# Patient Record
Sex: FEMALE | Race: BLACK OR AFRICAN AMERICAN | NOT HISPANIC OR LATINO | Employment: UNEMPLOYED | URBAN - METROPOLITAN AREA
[De-identification: names, ages, dates, MRNs, and addresses within clinical notes are randomized per-mention and may not be internally consistent; named-entity substitution may affect disease eponyms.]

---

## 2020-05-11 ENCOUNTER — HOSPITAL ENCOUNTER (EMERGENCY)
Facility: HOSPITAL | Age: 60
Discharge: HOME | End: 2020-05-11
Attending: EMERGENCY MEDICINE
Payer: MEDICAID

## 2020-05-11 VITALS
BODY MASS INDEX: 35 KG/M2 | OXYGEN SATURATION: 98 % | RESPIRATION RATE: 18 BRPM | DIASTOLIC BLOOD PRESSURE: 85 MMHG | TEMPERATURE: 98 F | HEART RATE: 79 BPM | HEIGHT: 71 IN | WEIGHT: 250 LBS | SYSTOLIC BLOOD PRESSURE: 138 MMHG

## 2020-05-11 DIAGNOSIS — L30.9 DERMATITIS: Primary | ICD-10-CM

## 2020-05-11 PROCEDURE — 99283 EMERGENCY DEPT VISIT LOW MDM: CPT

## 2020-05-11 RX ORDER — PREDNISONE 20 MG/1
40 TABLET ORAL DAILY
Qty: 10 TABLET | Refills: 0 | Status: SHIPPED | OUTPATIENT
Start: 2020-05-11 | End: 2020-05-14

## 2020-05-11 NOTE — ED PROVIDER NOTES
Encounter Date: 5/11/2020    SCRIBE #1 NOTE: I, Syl Phoenix, am scribing for, and in the presence of,  Dr. Vargas. I have scribed the entire note.       History     Chief Complaint   Patient presents with    Rash     Pt has rash to arms and face x 2 weeks.      Time seen by provider: 7:41 AM on 05/11/2020    The patient is a 59 y.o female who presents to the ED due to a rash that started about 2 weeks ago. The patient states that it started on her left arm and has now spread to her right arm, neck. She describes it as itchy with some swelling and redness. She denies any pain to the area, fever, or difficulty breathing. The patient notes that she usually gets a small rash on her arms every summer but her current symptoms are worse than normal. She used a topical cortisone cream that helped for a while. She denies new detergents or soaps, perfumes, etc, and states that she occasionally gardens and runs outside but says she does not recall any contact with poison ivy/oak.     The history is provided by the patient.     Review of patient's allergies indicates:  No Known Allergies  No past medical history on file.  No past surgical history on file.  No family history on file.  Social History     Tobacco Use    Smoking status: Not on file   Substance Use Topics    Alcohol use: Not on file    Drug use: Not on file     Review of Systems   General: No fever.  No chills.  Eyes: No visual changes.  Head: No headache.    Integument: Rash to bilateral arms, neck. No lesions.  Chest: No shortness of breath.  Cardiovascular: No chest pain.  Abdomen: No abdominal pain.  No nausea or vomiting.  Urinary: No abnormal urination.  Neurologic: No focal weakness.  No numbness.  Hematologic: No easy bruising.  Endocrine: No excessive thirst or urination.      Physical Exam     Initial Vitals [05/11/20 0728]   BP Pulse Resp Temp SpO2   138/85 79 18 97.5 °F (36.4 °C) 98 %      MAP       --         Physical Exam   Appearance: No acute  distress.   HEENT: Normocephalic. Atraumatic. No conjunctival injection. EOMI. PERRL.  No angioedema.  Neck: No stridor. No JVD. Neck supple.    Chest: Non-tender. No respiratory distress. No wheezing. Speaking in full sentences.  Cardiovascular: Regular rate and rhythm. +2 radial pulses bilaterally.  Abdomen: Not distended   Musculoskeletal: Good range of motion all joints. No deformities.    Neurologic: Alert and oriented x 3.  Equal strength in upper and lower extremities bilaterally. Normal sensation. No facial droop. Normal speech.    Psych:  Appropriate, conversant   Integumentary: Diffuse papular somewhat coalescing exanthem to bilateral upper extremities and bilateral neck. No tenderness to palpation or crepitus. No abnormalities of web spaces. Good turgor.  No abrasions.  No ecchymoses.      ED Course   Procedures  Labs Reviewed - No data to display       Imaging Results    None                                          Clinical Impression:     1. Dermatitis                ED Disposition Condition    Discharge Stable      60 yo female presents to ED for evaluation of rash x 2 weeks. Exam shows VSS, afeb. Diffuse papular somewhat coalescing exanthem to bilateral upper extremities and bilateral neck. No tenderness to palpation or crepitus. No abnormalities of web spaces. Discussed r/b/a topical steroids v oral; pt chose oral.    Discussed results, diagnosis, and treatment plan with pt; advised close follow-up with PCP. Reviewed strict return precautions. Pt confirms understanding and ability to comply.      I, Dr. Isaura Vargas, personally performed the services described in this documentation. All medical record entries made by the scribe were at my direction and in my presence.  I have reviewed the chart and agree that the record reflects my personal performance and is accurate and complete. Isaura Vargas MD.  10:21 AM 05/11/2020          ED Prescriptions     Medication Sig Dispense Start Date End Date  Auth. Provider    predniSONE (DELTASONE) 20 MG tablet Take 2 tablets (40 mg total) by mouth once daily. for 3 days 10 tablet 5/11/2020 5/14/2020 Isaura Vargas MD        Follow-up Information     Follow up With Specialties Details Why Contact Info    Jessenia Moffett MD Pediatrics Schedule an appointment as soon as possible for a visit   [address] [place] [address]                               Isaura Vargas MD  05/11/20 7498       Isaura Vargas MD  05/11/20 4771

## 2020-05-11 NOTE — ED NOTES
APPEARANCE: Alert, oriented and in no acute distress.  CARDIAC: Normal rate and rhythm, no murmur heard.   PERIPHERAL VASCULAR: peripheral pulses present. Normal cap refill. No edema. Warm to touch.    RESPIRATORY:Normal rate and effort, breath sounds clear bilaterally throughout chest. Respirations are equal and unlabored no obvious signs of distress.  GASTRO: soft, bowel sounds normal, no tenderness, no abdominal distention.  MUSC: Full ROM. No bony tenderness or soft tissue tenderness. No obvious deformity.  SKIN: Skin is warm and dry, normal skin turgor, mucous membranes moist. Rash noted to bilateral arms, neck and face.  NEURO: 5/5 strength major flexors/extensors bilaterally. Sensory intact to light touch bilaterally. Donald coma scale: eyes open spontaneously-4, oriented & converses-5, obeys commands-6. No neurological abnormalities.   MENTAL STATUS: awake, alert and aware of environment.  EYE: PERRL, both eyes: pupils brisk and reactive to light. Normal size.  ENT: EARS: no obvious drainage. NOSE: no active bleeding.   Pt has rash to bilateral arms, neck and face x 2 weeks.

## 2020-05-13 ENCOUNTER — HOSPITAL ENCOUNTER (EMERGENCY)
Facility: HOSPITAL | Age: 60
Discharge: HOME | End: 2020-05-13
Attending: EMERGENCY MEDICINE
Payer: MEDICAID

## 2020-05-13 VITALS
BODY MASS INDEX: 35 KG/M2 | HEART RATE: 82 BPM | HEIGHT: 71 IN | DIASTOLIC BLOOD PRESSURE: 94 MMHG | RESPIRATION RATE: 16 BRPM | OXYGEN SATURATION: 98 % | WEIGHT: 250 LBS | TEMPERATURE: 98 F | SYSTOLIC BLOOD PRESSURE: 154 MMHG

## 2020-05-13 DIAGNOSIS — R21 RASH AND NONSPECIFIC SKIN ERUPTION: Primary | ICD-10-CM

## 2020-05-13 PROCEDURE — 99284 EMERGENCY DEPT VISIT MOD MDM: CPT

## 2020-05-13 RX ORDER — METHYLPREDNISOLONE 4 MG/1
TABLET ORAL
Qty: 1 PACKAGE | Refills: 0 | Status: SHIPPED | OUTPATIENT
Start: 2020-05-13 | End: 2020-06-03

## 2020-05-13 RX ORDER — TRIAMCINOLONE ACETONIDE 1 MG/G
OINTMENT TOPICAL 2 TIMES DAILY
Qty: 1 TUBE | Refills: 1 | Status: SHIPPED | OUTPATIENT
Start: 2020-05-13

## 2020-05-14 NOTE — PROGRESS NOTES
Emergency Department TeleTRIAGE Encounter Note    CHIEF COMPLAINT    Chief Complaint   Patient presents with    Rash     59y F ambulatroy to ED with c/o red, raised, itching rash x2 weeks. pt was seen on 5/11/20 for same complaint       VITAL SIGNS:   Initial Vitals [05/13/20 1916]   BP Pulse Resp Temp SpO2   (!) 154/94 82 16 98.2 °F (36.8 °C) 98 %      MAP       --            ALLERGIES    Review of patient's allergies indicates:   Allergen Reactions    Shellfish containing products Swelling       PROVIDER TRIAGE NOTE  Patient with no significant past medical history presents to the ED for evaluation of rash.  Patient reports red, raised, itching rash past 2 weeks.  She was seen 2 nights ago for the same complaint.  At that time she was prescribed prednisone which she states she is taking without relief.  She is also taking Benadryl without relief of the itching.  She denies fever, new soaps, lotions, detergents, other skin care products.                Virtual Visit Note: The provider triage portion of this emergency department evaluation was performed via a HIPAA-compliant audiovisual telemedicine application, in concert with a tele-presenter in the room. A face to face patient evaluation with a provider will occur once the patient is placed in an emergency department room.    DISCLAIMER: This note was prepared with [employer] voice recognition transcription software. Garbled syntax, mangled pronouns, and other bizarre constructions may be attributed to that software system.

## 2020-05-14 NOTE — ED TRIAGE NOTES
Present to ED due to complaint of rash to body and itching. Report was seen in the ED Monday and was given Prednisone with marked improvement but when prescription was completed rash returned. Report only new food is hazelnut creamer. Rash noted to face and body.

## 2020-05-14 NOTE — ED NOTES
APPEARANCE: Alert, oriented and in no acute distress.  PERIPHERAL VASCULAR: peripheral pulses present. Normal cap refill. No edema. Warm to touch.    MUSC: Full ROM. No bony tenderness or soft tissue tenderness. No obvious deformity.  SKIN: Skin is warm and dry, normal skin turgor, mucous membranes moist. RASH NOTED TO FACE AND BODY.  NEURO: 5/5 strength major flexors/extensors bilaterally. Sensory intact to light touch bilaterally. Garrett coma scale: eyes open spontaneously-4, oriented & converses-5, obeys commands-6. No neurological abnormalities.   MENTAL STATUS: awake, alert and aware of environment.  ENT: EARS: no obvious drainage. NOSE: no active bleeding.

## 2020-05-14 NOTE — ED PROVIDER NOTES
"Encounter Date: 5/13/2020    SCRIBE #1 NOTE: I, Jensen Hall, am scribing for, and in the presence of, Teri Kearns MD.       History     Chief Complaint   Patient presents with    Rash     59y F ambulatroy to ED with c/o red, raised, itching rash x2 weeks. pt was seen on 5/11/20 for same complaint     Time seen by provider: 8:28 PM on 05/13/2020    The patient is a 59 y.o. female who presents to the ED for evaluation of generalized rash for the past 2 weeks. Patient reports red, itchy, raised rashes to her face, neck, and arms. Patient was seen 2 days ago for same and discharged on steroids without improvement. Patient reports she usually develops rashes in the summer but "not this bad." She denies using any new soaps/detergents or topical creams but does mention using a new hazelnut cream in her coffee which she has never used before until recently. She denies fever, chills, chest pain or SOB. She has not seen a Dermatologist for this.        The history is provided by the patient.     Review of patient's allergies indicates:   Allergen Reactions    Shellfish containing products Swelling     No past medical history on file.  No past surgical history on file.  History reviewed. No pertinent family history.  Social History     Tobacco Use    Smoking status: Not on file   Substance Use Topics    Alcohol use: Not on file    Drug use: Not on file     Review of Systems   Constitutional: Negative for chills and fever.   Respiratory: Negative for shortness of breath.    Cardiovascular: Negative for chest pain.   Skin: Positive for rash.   All other systems reviewed and are negative.      Physical Exam     Initial Vitals [05/13/20 1916]   BP Pulse Resp Temp SpO2   (!) 154/94 82 16 98.2 °F (36.8 °C) 98 %      MAP       --         Physical Exam    Nursing note and vitals reviewed.  Constitutional: She appears well-developed and well-nourished.   HENT:   Head: Normocephalic and atraumatic.   Eyes: EOM are normal. Pupils " are equal, round, and reactive to light.   Neck: Normal range of motion. Neck supple.   Musculoskeletal: Normal range of motion. She exhibits no edema.   Neurological: She is alert and oriented to person, place, and time. GCS score is 15. GCS eye subscore is 4. GCS verbal subscore is 5. GCS motor subscore is 6.   Skin: Skin is warm and dry. Rash noted.    macular papular blanching rash in the upper extremities, chest, neck and face.  There is no sloshing of skin,nikolsky is negative.  No significant warmth, no blistering, no cellulitic signs    Psychiatric: She has a normal mood and affect. Her behavior is normal. Thought content normal.         ED Course   Procedures  Labs Reviewed - No data to display       Imaging Results    None          Medical Decision Making:   Initial Assessment:   This is a 59-year-old female who reports a history of hypertension, hyperlipidemia, seizure disorder on Keppra who presents the ER for evaluation of rash.  Patient reported onset of rash was over 3-4 weeks ago, she tends to get this type of rash in the summer when it is hot, however this is the worst outbreak she has had.  She denies any fever, chills, chest pain, shortness of breath, rash in her mouth, or genitals.  She denies any new lotions, creams, conditioners, soaps or shampoos.  She was seen and evaluated in this ER recently, was given prednisone which she completed and reported improvement of her symptoms but now with worsen.  She came to the ER because of worsening of symptoms.  Patient has a macular papular blanching rash in the upper extremities, chest, neck and face.  There is no sloshing of this skin,nikolsky is negative.  Patient's vitals are stable at this time.  Differential includes nonspecific dermatitis, allergic reaction, dress syndrome, erythema multiform, Jose Francisco Sabino's, or toxic epidermal necrolysis or heat rash.  I discussed with patient at length, she reports she does get this same rash every year, but  it tends to be worse.  Discussed the patient at length, discussed plan to discharge home, will give Medrol Dosepak as well as topical steroids and will give referral for dermatologist.  If rashes worsened, patient will stop her Keppra in case this is early dress or Jose Francisco Sabino's.  Strict return precautions were discussed with patient understood agreed with.  Patient will be discharged.                Attending Attestation:           Physician Attestation for Scribe:  Physician Attestation Statement for Scribe #1: Teri ABBOTT MD, reviewed documentation, as scribed by Jensen aHll in my presence, and it is both accurate and complete.                               Clinical Impression:       ICD-10-CM ICD-9-CM   1. Rash and nonspecific skin eruption R21 782.1           Disposition:   Disposition: Discharged  Condition: Stable     ED Disposition Condition    Discharge Stable        ED Prescriptions     Medication Sig Dispense Start Date End Date Auth. Provider    triamcinolone acetonide 0.1% (KENALOG) 0.1 % ointment Apply topically 2 (two) times daily. 1 Tube 5/13/2020  Teri Kearns MD    methylPREDNISolone (MEDROL DOSEPACK) 4 mg tablet Day 1: 24 mg on day 1 administered as 8 mg (2 tablets) before breakfast, 4 mg (1 tablet) after lunch, 4 mg (1 tablet) after supper, and 8 mg (2 tablets) at bedtime or 24 mg (6 tablets)   Day 2: 20 mg on day 2 administered as 4 mg (1 tablet) before breakfast, 4 mg (1 tablet) after lunch, 4 mg (1 tablet) after supper, and 8 mg (2 tablets) at bedtime.  Day 3: 16 mg on day 3 administered as 4 mg (1 tablet) before breakfast, 4 mg (1 tablet) after lunch, 4 mg (1 tablet) after supper, and 4 mg (1 tablet) at bedtime.  Day 4: 12 mg on day 4 administered as 4 mg (1 tablet) before breakfast, 4 mg (1 tablet) after lunch, and 4 mg (1 tablet) at bedtime.  Day 5: 8 mg on day 5 administered as 4 mg (1 tablet) before breakfast and 4 mg (1 tablet) at bedtime.  Day 6: 4 mg on day 6  administered as 4 mg (1 tablet) before breakfast. 1 Package 5/13/2020 6/3/2020 Teri Kearns MD        Follow-up Information     Follow up With Specialties Details Why Contact Info Additional Information    Jessenia Moffett MD Pediatrics   [address]  [place] [address]       [facility] Dermatology Dermatology Call in 1 day  [address] [address] [phone] [facility] [place], [address]                                      Teri Kearns MD  05/13/20 2308

## 2020-06-02 ENCOUNTER — PATIENT OUTREACH (OUTPATIENT)
Dept: EMERGENCY MEDICINE | Facility: HOSPITAL | Age: 60
End: 2020-06-02

## 2020-06-11 NOTE — PROGRESS NOTES
Jerri Cuello MA  ED Navigator  Emergency Department    Project: [facility] ED Navigator  Role: ED Navigator    Date: 06/11/2020  Patient Name: Jacque Burns  MRN: [id number]  PCP: Jessenia Moffett MD    Assessment:     Jacque Burns is a 59 y.o. female who has presented to ED for No chief complaint on file.  . Patient has visited the ED 2 times in the past 3 months. Patient did not contact PCP.     ED Navigator Patient Assessment    Consent to Services  Does patient consent to completing the assessment?:  Yes  Transportation  Does the patient have issues with Transportation?:  No  Insurance Coverage  Do you have coverage/adequate coverage?:  Yes  Type/kind of coverage:  Medicaid  Is patient able to afford co-pays/deductibles?:  Yes  Is patient able to afford HME or supplies?:  Yes  Does patient have an established [facility] PCP?:  No  Does patient need assistance finding a PCP?:  No  Does the patient have a lack of adequate coverage?:  No  Specialist Appointment  Did the patient come to the ED to see a specialist?:  Yes  Does the patient have a pending specialist referral?:  Yes  Does the patient have a specialist appointment made?:  Yes  Is the patient able to access a timely specialist appointment?:  Yes (Comment: Had appt on 6/5/20)  PCP Follow Up Appointment  Does the patient have a follow up appontment with their PCP?:  No  Why does the patient not have a follow up scheduled?:  Other (see comments) (Comment: Pt already saw the Dermatologist )  Medications  Is patient able to afford medication?:  Yes  Is patient unable to get medication due to lack of transportation?:  No  Psychological  Does the patient have psycho-social concerns?:  No  Food  Does the patient have concerns about food?:  No  Communication/Education  Does the patient have limited English proficiency/English not primary language?:  No  Does patient have low literacy and/or low health literacy?:  No  Does patient have concerns with care?:  No  Does  patient have dissatisfaction with care?:  No  Other Financial Concers  Does the patient have immediate financial distress?:  No  Does the patient have general financial concerns?:  No  Other Social Barriers/Concerns  Does the patient have any additional barriers or concerns?:  Dental         Social History     Socioeconomic History    Marital status:      Spouse name: Not on file    Number of children: Not on file    Years of education: Not on file    Highest education level: Not on file   Occupational History    Not on file   Social Needs    Financial resource strain: Not very hard    Food insecurity:     Worry: Patient refused     Inability: Patient refused    Transportation needs:     Medical: Patient refused     Non-medical: Patient refused   Tobacco Use    Smoking status: Not on file   Substance and Sexual Activity    Alcohol use: Not on file    Drug use: Not on file    Sexual activity: Not on file   Lifestyle    Physical activity:     Days per week: 7 days     Minutes per session: 60 min    Stress: Only a little   Relationships    Social connections:     Talks on phone: More than three times a week     Gets together: More than three times a week     Attends Synagogue service: More than 4 times per year     Active member of club or organization: No     Attends meetings of clubs or organizations: Never     Relationship status:    Other Topics Concern    Not on file   Social History Narrative    Not on file       Plan:   Pt declined [facility].  Pt states that she goes to [facility] and likes to go there for her care.  Pt had a follow up visit with Dermatology on 6/5/20.  Provided pt with dental resources and [facility] On [facility] 24/7 Nurse triage line, [phone] or [phone] ([phone])      Appointment made with: Jessenia Moffett MD

## 2020-07-02 ENCOUNTER — LAB VISIT (OUTPATIENT)
Dept: PRIMARY CARE CLINIC | Facility: OTHER | Age: 60
End: 2020-07-02
Attending: INTERNAL MEDICINE
Payer: MEDICAID

## 2020-07-02 DIAGNOSIS — Z03.818 ENCOUNTER FOR OBSERVATION FOR SUSPECTED EXPOSURE TO OTHER BIOLOGICAL AGENTS RULED OUT: ICD-10-CM

## 2020-07-02 PROCEDURE — U0003 INFECTIOUS AGENT DETECTION BY NUCLEIC ACID (DNA OR RNA); SEVERE ACUTE RESPIRATORY SYNDROME CORONAVIRUS 2 (SARS-COV-2) (CORONAVIRUS DISEASE [COVID-19]), AMPLIFIED PROBE TECHNIQUE, MAKING USE OF HIGH THROUGHPUT TECHNOLOGIES AS DESCRIBED BY CMS-2020-01-R: HCPCS

## 2020-07-06 LAB — SARS-COV-2 RNA RESP QL NAA+PROBE: NOT DETECTED

## 2022-03-21 ENCOUNTER — OFFICE VISIT (OUTPATIENT)
Dept: URGENT CARE | Facility: CLINIC | Age: 62
End: 2022-03-21
Payer: MEDICAID

## 2022-03-21 VITALS
WEIGHT: 250 LBS | SYSTOLIC BLOOD PRESSURE: 134 MMHG | TEMPERATURE: 98 F | RESPIRATION RATE: 17 BRPM | OXYGEN SATURATION: 97 % | HEART RATE: 72 BPM | BODY MASS INDEX: 35 KG/M2 | DIASTOLIC BLOOD PRESSURE: 92 MMHG | HEIGHT: 71 IN

## 2022-03-21 DIAGNOSIS — L20.9 ATOPIC DERMATITIS, UNSPECIFIED TYPE: Primary | ICD-10-CM

## 2022-03-21 PROBLEM — M85.80 OSTEOPENIA: Status: ACTIVE | Noted: 2020-12-16

## 2022-03-21 PROBLEM — L40.9 PSORIASIS: Status: ACTIVE | Noted: 2020-06-25

## 2022-03-21 PROCEDURE — 1160F RVW MEDS BY RX/DR IN RCRD: CPT | Mod: CPTII,S$GLB,,

## 2022-03-21 PROCEDURE — 3075F PR MOST RECENT SYSTOLIC BLOOD PRESS GE 130-139MM HG: ICD-10-PCS | Mod: CPTII,S$GLB,,

## 2022-03-21 PROCEDURE — 1159F PR MEDICATION LIST DOCUMENTED IN MEDICAL RECORD: ICD-10-PCS | Mod: CPTII,S$GLB,,

## 2022-03-21 PROCEDURE — 3080F PR MOST RECENT DIASTOLIC BLOOD PRESSURE >= 90 MM HG: ICD-10-PCS | Mod: CPTII,S$GLB,,

## 2022-03-21 PROCEDURE — 99203 PR OFFICE/OUTPT VISIT, NEW, LEVL III, 30-44 MIN: ICD-10-PCS | Mod: S$GLB,,,

## 2022-03-21 PROCEDURE — 99203 OFFICE O/P NEW LOW 30 MIN: CPT | Mod: S$GLB,,,

## 2022-03-21 PROCEDURE — 3075F SYST BP GE 130 - 139MM HG: CPT | Mod: CPTII,S$GLB,,

## 2022-03-21 PROCEDURE — 3008F BODY MASS INDEX DOCD: CPT | Mod: CPTII,S$GLB,,

## 2022-03-21 PROCEDURE — 1159F MED LIST DOCD IN RCRD: CPT | Mod: CPTII,S$GLB,,

## 2022-03-21 PROCEDURE — 1160F PR REVIEW ALL MEDS BY PRESCRIBER/CLIN PHARMACIST DOCUMENTED: ICD-10-PCS | Mod: CPTII,S$GLB,,

## 2022-03-21 PROCEDURE — 3080F DIAST BP >= 90 MM HG: CPT | Mod: CPTII,S$GLB,,

## 2022-03-21 PROCEDURE — 3008F PR BODY MASS INDEX (BMI) DOCUMENTED: ICD-10-PCS | Mod: CPTII,S$GLB,,

## 2022-03-21 RX ORDER — TACROLIMUS 1 MG/G
OINTMENT TOPICAL 2 TIMES DAILY
Qty: 30 G | Refills: 0 | Status: SHIPPED | OUTPATIENT
Start: 2022-03-21 | End: 2022-03-28

## 2022-03-21 RX ORDER — AMLODIPINE BESYLATE 10 MG/1
10 TABLET ORAL DAILY
COMMUNITY
Start: 2021-12-15

## 2022-03-21 RX ORDER — CETIRIZINE HYDROCHLORIDE 5 MG/1
5 TABLET ORAL EVERY 12 HOURS
Qty: 14 TABLET | Refills: 0 | Status: SHIPPED | OUTPATIENT
Start: 2022-03-21 | End: 2022-03-28

## 2022-03-21 RX ORDER — LEVETIRACETAM 500 MG/1
500 TABLET ORAL 2 TIMES DAILY
COMMUNITY
Start: 2022-02-06

## 2022-03-21 NOTE — PATIENT INSTRUCTIONS
- Take cetirizine every 12 hours until symptoms resolve.   - Use tacrolimus cream to the affected area until symptoms resolve.   - If you start to develop shortness or breath or the feeling of your throat closing, go to the emergency room immediately.     - Rest.    - Drink plenty of fluids.    - Acetaminophen (tylenol) or Ibuprofen (advil,motrin) as directed as needed for fever/pain. Avoid tylenol if you have a history of liver disease. Do not take ibuprofen if you have a history of GI bleeding, kidney disease, or if you take blood thinners.     - You must understand that you have received an Urgent Care treatment only and that you may be released before all of your medical problems are known or treated.   - You, the patient, will arrange for follow up care as instructed.   - If your condition worsens or fails to improve we recommend that you receive another evaluation at the ER immediately or contact your PCP to discuss your concerns or return here.   - Follow up with your PCP or specialty clinic as directed in the next 1-2 weeks if not improved or as needed.  You can call [phone] to schedule an appointment with the appropriate provider.    If your symptoms do not improve or worsen, go to the emergency room immediately.

## 2022-03-21 NOTE — PROGRESS NOTES
"Subjective:       Patient ID: Jacque Burns is a 61 y.o. female.    Vitals:  height is 5' 11" (1.803 m) and weight is 113.4 kg (250 lb). Her oral temperature is 97.6 °F (36.4 °C). Her blood pressure is 134/92 (abnormal) and her pulse is 72. Her respiration is 17 and oxygen saturation is 97%.     Chief Complaint: Rash    Patient states she is allergic to shell fish and ordered on Thursday night some chinese food, she got beef fried rice and her friend got shrimp fried rice. She thinks they were cross contaminated because she started swelling that night and she immediately took some benedryl. The swelling went down, but the rash didn't go away. She states she only took benadryl that night and has not taken an antihistamine since then. She has been putting witch hazel and aquaphor on the rash, which has helped a little. The rash is localized to her left cheek. She denies SOB or throat swelling. She denies itchiness.     Rash  This is a new problem. The current episode started in the past 7 days (5 days ago). The problem is unchanged. The affected locations include the face, lips, left eye and right eye. The rash is characterized by itchiness and redness. She was exposed to shellfish. Associated symptoms include facial edema. Pertinent negatives include no congestion, cough, diarrhea, eye pain, fatigue, fever, joint pain, shortness of breath, sore throat or vomiting. Treatments tried: witch hazel and aquaphor. The treatment provided no relief. Her past medical history is significant for allergies. There is no history of asthma or eczema.       Constitution: Negative for fatigue and fever.   HENT: Negative for ear pain, congestion, sinus pain, sinus pressure and sore throat.    Neck: Negative for neck pain and neck stiffness.   Eyes: Negative for eye pain.   Respiratory: Negative for cough and shortness of breath.    Gastrointestinal: Negative for nausea, vomiting, constipation and diarrhea.   Musculoskeletal: " Negative for pain and trauma.   Skin: Positive for rash, skin thickening/induration and erythema. Negative for abscess and hives.   Allergic/Immunologic: Negative for hives and itching.   Neurological: Negative for disorientation.   Psychiatric/Behavioral: Negative for disorientation and confusion.       Objective:      Physical Exam   Constitutional: She is oriented to person, place, and time. She appears well-developed.   HENT:   Head: Normocephalic and atraumatic. Head is without abrasion, without contusion and without laceration.   Ears:   Right Ear: External ear normal.   Left Ear: External ear normal.   Nose: Nose normal.   Mouth/Throat: Oropharynx is clear and moist and mucous membranes are normal.   Eyes: Conjunctivae, EOM and lids are normal. Pupils are equal, round, and reactive to light.   Neck: Trachea normal and phonation normal. Neck supple.   Cardiovascular: Normal rate, regular rhythm and normal heart sounds.   Pulmonary/Chest: Effort normal and breath sounds normal. No stridor. No respiratory distress.   Musculoskeletal: Normal range of motion.         General: Normal range of motion.   Neurological: She is alert and oriented to person, place, and time.   Skin: Skin is warm, dry, intact, not pale, rash, not urticarial, not pustular, not purpuric, not vesicular, papular, maculopapular and no abscessed. Capillary refill takes less than 2 seconds. erythema No abrasion, No burn, No bruising and No ecchymosis              Comments: Rash is localized to the left side of the face.    Psychiatric: Her speech is normal and behavior is normal. Judgment and thought content normal.   Nursing note and vitals reviewed.            Assessment:       1. Atopic dermatitis, unspecified type          Plan:         Atopic dermatitis, unspecified type  -     cetirizine (ZYRTEC) 5 MG tablet; Take 1 tablet (5 mg total) by mouth every 12 (twelve) hours. for 7 days  Dispense: 14 tablet; Refill: 0  -     tacrolimus (PROTOPIC)  0.1 % ointment; Apply topically 2 (two) times daily. for 7 days  Dispense: 30 g; Refill: 0                 Patient Instructions   - Take cetirizine every 12 hours until symptoms resolve.   - Use tacrolimus cream to the affected area until symptoms resolve.   - If you start to develop shortness or breath or the feeling of your throat closing, go to the emergency room immediately.     - Rest.    - Drink plenty of fluids.    - Acetaminophen (tylenol) or Ibuprofen (advil,motrin) as directed as needed for fever/pain. Avoid tylenol if you have a history of liver disease. Do not take ibuprofen if you have a history of GI bleeding, kidney disease, or if you take blood thinners.     - You must understand that you have received an Urgent Care treatment only and that you may be released before all of your medical problems are known or treated.   - You, the patient, will arrange for follow up care as instructed.   - If your condition worsens or fails to improve we recommend that you receive another evaluation at the ER immediately or contact your PCP to discuss your concerns or return here.   - Follow up with your PCP or specialty clinic as directed in the next 1-2 weeks if not improved or as needed.  You can call [phone] to schedule an appointment with the appropriate provider.    If your symptoms do not improve or worsen, go to the emergency room immediately.

## 2022-05-12 ENCOUNTER — OFFICE VISIT (OUTPATIENT)
Dept: URGENT CARE | Facility: CLINIC | Age: 62
End: 2022-05-12
Payer: MEDICAID

## 2022-05-12 VITALS
BODY MASS INDEX: 35 KG/M2 | OXYGEN SATURATION: 95 % | DIASTOLIC BLOOD PRESSURE: 92 MMHG | TEMPERATURE: 99 F | HEIGHT: 71 IN | SYSTOLIC BLOOD PRESSURE: 144 MMHG | HEART RATE: 85 BPM | RESPIRATION RATE: 20 BRPM | WEIGHT: 250 LBS

## 2022-05-12 DIAGNOSIS — R21 RASH: Primary | ICD-10-CM

## 2022-05-12 DIAGNOSIS — T78.40XA ALLERGIC REACTION, INITIAL ENCOUNTER: ICD-10-CM

## 2022-05-12 PROCEDURE — 1160F PR REVIEW ALL MEDS BY PRESCRIBER/CLIN PHARMACIST DOCUMENTED: ICD-10-PCS | Mod: CPTII,S$GLB,, | Performed by: FAMILY MEDICINE

## 2022-05-12 PROCEDURE — 3080F PR MOST RECENT DIASTOLIC BLOOD PRESSURE >= 90 MM HG: ICD-10-PCS | Mod: CPTII,S$GLB,, | Performed by: FAMILY MEDICINE

## 2022-05-12 PROCEDURE — 1159F MED LIST DOCD IN RCRD: CPT | Mod: CPTII,S$GLB,, | Performed by: FAMILY MEDICINE

## 2022-05-12 PROCEDURE — 3077F SYST BP >= 140 MM HG: CPT | Mod: CPTII,S$GLB,, | Performed by: FAMILY MEDICINE

## 2022-05-12 PROCEDURE — 1159F PR MEDICATION LIST DOCUMENTED IN MEDICAL RECORD: ICD-10-PCS | Mod: CPTII,S$GLB,, | Performed by: FAMILY MEDICINE

## 2022-05-12 PROCEDURE — 99213 PR OFFICE/OUTPT VISIT, EST, LEVL III, 20-29 MIN: ICD-10-PCS | Mod: S$GLB,,, | Performed by: FAMILY MEDICINE

## 2022-05-12 PROCEDURE — 3008F PR BODY MASS INDEX (BMI) DOCUMENTED: ICD-10-PCS | Mod: CPTII,S$GLB,, | Performed by: FAMILY MEDICINE

## 2022-05-12 PROCEDURE — 99213 OFFICE O/P EST LOW 20 MIN: CPT | Mod: S$GLB,,, | Performed by: FAMILY MEDICINE

## 2022-05-12 PROCEDURE — 3008F BODY MASS INDEX DOCD: CPT | Mod: CPTII,S$GLB,, | Performed by: FAMILY MEDICINE

## 2022-05-12 PROCEDURE — 3080F DIAST BP >= 90 MM HG: CPT | Mod: CPTII,S$GLB,, | Performed by: FAMILY MEDICINE

## 2022-05-12 PROCEDURE — 1160F RVW MEDS BY RX/DR IN RCRD: CPT | Mod: CPTII,S$GLB,, | Performed by: FAMILY MEDICINE

## 2022-05-12 PROCEDURE — 3077F PR MOST RECENT SYSTOLIC BLOOD PRESSURE >= 140 MM HG: ICD-10-PCS | Mod: CPTII,S$GLB,, | Performed by: FAMILY MEDICINE

## 2022-05-12 RX ORDER — TRIAMCINOLONE ACETONIDE 1 MG/G
CREAM TOPICAL 2 TIMES DAILY
Qty: 80 G | Refills: 1 | Status: SHIPPED | OUTPATIENT
Start: 2022-05-12 | End: 2022-05-22

## 2022-05-12 RX ORDER — DEXAMETHASONE SODIUM PHOSPHATE 100 MG/10ML
10 INJECTION INTRAMUSCULAR; INTRAVENOUS
Status: COMPLETED | OUTPATIENT
Start: 2022-05-12 | End: 2022-05-12

## 2022-05-12 RX ADMIN — DEXAMETHASONE SODIUM PHOSPHATE 10 MG: 100 INJECTION INTRAMUSCULAR; INTRAVENOUS at 06:05

## 2022-05-12 NOTE — PROGRESS NOTES
"Subjective:       Patient ID: Jacque Burns is a 61 y.o. female.    Vitals:  height is 5' 11" (1.803 m) and weight is 113.4 kg (250 lb). Her oral temperature is 98.7 °F (37.1 °C). Her blood pressure is 144/92 (abnormal) and her pulse is 85. Her respiration is 20 and oxygen saturation is 95%.     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 The current episode started 1 to 4 weeks ago. The problem is unchanged. The affected locations include the left arm, neck, chest, right arm, right elbow, left elbow, left wrist and right wrist. The rash is characterized by redness, scaling and pain. She was exposed to shellfish. Pertinent negatives include no sore throat. Past treatments include antibiotic cream, antihistamine and anti-itch cream. The treatment provided no relief.       HENT: Negative for sore throat.    Skin: Positive for rash.       Objective:      Physical Exam   Constitutional: She is oriented to person, place, and time. She appears well-developed. She is cooperative.  Non-toxic appearance. She does not appear ill. No distress.   HENT:   Head: Normocephalic and atraumatic.   Ears:   Right Ear: Hearing, tympanic membrane, external ear and ear canal normal.   Left Ear: Hearing, tympanic membrane, external ear and ear canal normal.   Nose: Nose normal. No mucosal edema, rhinorrhea or nasal deformity. No epistaxis. Right sinus exhibits no maxillary sinus tenderness and no frontal sinus tenderness. Left sinus exhibits no maxillary sinus tenderness and no frontal sinus tenderness.   Mouth/Throat: Uvula is midline, oropharynx is clear and moist and mucous membranes are normal. Mucous membranes are moist. No trismus in the " jaw. Normal dentition. No uvula swelling. No posterior oropharyngeal erythema. Oropharynx is clear.   Eyes: Conjunctivae and lids are normal. Pupils are equal, round, and reactive to light. Right eye exhibits no discharge. Left eye exhibits no discharge. No scleral icterus. Extraocular movement intact   Neck: Trachea normal and phonation normal. Neck supple.   Cardiovascular: Normal rate, regular rhythm, normal heart sounds and normal pulses.   Pulmonary/Chest: Effort normal and breath sounds normal. No respiratory distress.   Abdominal: Normal appearance and bowel sounds are normal. She exhibits no distension, no pulsatile midline mass and no mass. Soft. There is no abdominal tenderness.   Musculoskeletal: Normal range of motion.         General: No deformity. Normal range of motion.   Neurological: She is alert and oriented to person, place, and time. She exhibits normal muscle tone. Coordination normal.   Skin: Skin is warm, dry, intact, not diaphoretic and not pale.         Comments: Scattered urticarial rash, some papular on both forearms and neck area     Psychiatric: Her speech is normal and behavior is normal. Judgment and thought content normal.   Nursing note and vitals reviewed.        Assessment:       1. Rash    2. Allergic reaction, initial encounter          Plan:         Rash    Allergic reaction, initial encounter    Other orders  -     dexamethasone injection 10 mg  -     triamcinolone acetonide 0.1% (KENALOG) 0.1 % cream; Apply topically 2 (two) times daily. for 10 days  Dispense: 80 g; Refill: 1          Follow up with Allergist    If SOB go to ER

## 2023-07-24 ENCOUNTER — HOSPITAL ENCOUNTER (EMERGENCY)
Facility: HOSPITAL | Age: 63
Discharge: HOME | End: 2023-07-24
Attending: STUDENT IN AN ORGANIZED HEALTH CARE EDUCATION/TRAINING PROGRAM
Payer: MEDICAID

## 2023-07-24 VITALS
RESPIRATION RATE: 16 BRPM | WEIGHT: 255 LBS | BODY MASS INDEX: 35.57 KG/M2 | TEMPERATURE: 99 F | OXYGEN SATURATION: 99 % | HEART RATE: 60 BPM | SYSTOLIC BLOOD PRESSURE: 138 MMHG | DIASTOLIC BLOOD PRESSURE: 90 MMHG

## 2023-07-24 DIAGNOSIS — M79.604 RIGHT LEG PAIN: ICD-10-CM

## 2023-07-24 DIAGNOSIS — M25.561 RIGHT KNEE PAIN: ICD-10-CM

## 2023-07-24 DIAGNOSIS — M17.11 ARTHRITIS OF KNEE, RIGHT: Primary | ICD-10-CM

## 2023-07-24 PROCEDURE — 96372 THER/PROPH/DIAG INJ SC/IM: CPT | Performed by: PHYSICIAN ASSISTANT

## 2023-07-24 PROCEDURE — 99285 EMERGENCY DEPT VISIT HI MDM: CPT | Mod: 25

## 2023-07-24 PROCEDURE — 63600175 PHARM REV CODE 636 W HCPCS: Performed by: PHYSICIAN ASSISTANT

## 2023-07-24 RX ORDER — KETOROLAC TROMETHAMINE 30 MG/ML
15 INJECTION, SOLUTION INTRAMUSCULAR; INTRAVENOUS
Status: COMPLETED | OUTPATIENT
Start: 2023-07-24 | End: 2023-07-24

## 2023-07-24 RX ORDER — NAPROXEN 500 MG/1
500 TABLET ORAL 2 TIMES DAILY WITH MEALS
Qty: 30 TABLET | Refills: 0 | Status: SHIPPED | OUTPATIENT
Start: 2023-07-24

## 2023-07-24 RX ADMIN — KETOROLAC TROMETHAMINE 15 MG: 30 INJECTION, SOLUTION INTRAMUSCULAR; INTRAVENOUS at 01:07

## 2023-07-24 NOTE — DISCHARGE INSTRUCTIONS

## 2023-07-24 NOTE — ED PROVIDER NOTES
X-ray right Encounter Date: 7/24/2023       History     Chief Complaint   Patient presents with    Leg Pain     Pt reports to the ed with right calf pain. Pt denies any trauma.  Pt states that last week her right leg was a numb tingling type pain.      62-year-old female, PMH HTN, seizures, presents to ED with concern of right lower extremity pain has gradually worsened over past 2 weeks.  She reports pain initially began her lower thigh but has since spread to her right knee.  Pain is sharp, worse with touch or movement, improved with rest, severity 8/10.  No lower extremity swelling or skin changes, numbness or focal weakness.  Denies history of DVT/PE, recent travel or recent surgery.  She does report lifting, pushing and pulling heavy objects along with standing on her feet for long hours working as a .  No other acute complaints at this time.    The history is provided by the patient.   Review of patient's allergies indicates:   Allergen Reactions    Shellfish containing products Swelling     No past medical history on file.  No past surgical history on file.  No family history on file.  Social History     Tobacco Use    Smoking status: Never    Smokeless tobacco: Never   Substance Use Topics    Alcohol use: Never    Drug use: Never     Review of Systems   Constitutional:  Negative for chills and fever.   Musculoskeletal:  Positive for arthralgias. Negative for back pain and myalgias.   Skin:  Negative for color change and wound.   Neurological:  Negative for weakness and numbness.     Physical Exam     Initial Vitals [07/24/23 1150]   BP Pulse Resp Temp SpO2   139/89 68 14 -- 99 %      MAP       --         Physical Exam    Nursing note and vitals reviewed.  Constitutional: She appears well-developed and well-nourished. She is active. She does not have a sickly appearance. She does not appear ill. No distress.   HENT:   Head: Normocephalic and atraumatic.   Neck:   Normal range of  motion.  Pulmonary/Chest: Effort normal.   Musculoskeletal:      Cervical back: Normal range of motion.      Comments: Right leg pain beginning near medial joint line of left knee, extending distally to medial calf.  No overlying skin changes, erythema or warmth.  No noticeable lower extremity of right leg compared to left.  No pitting edema.  Distal sensations intact.  DP pulse intact.     Neurological: She is alert. GCS eye subscore is 4. GCS verbal subscore is 5. GCS motor subscore is 6.   Skin: Skin is warm and dry.   Psychiatric: She has a normal mood and affect. Her speech is normal and behavior is normal.       ED Course   Procedures  Labs Reviewed - No data to display       Imaging Results              US Lower Extremity Veins Right (Final result)  Result time 07/24/23 14:09:45      Final result by Madi Gallo MD (07/24/23 14:09:45)                   Impression:      No evidence of deep venous thrombosis in the right lower extremity.      Electronically signed by: Madi Gallo MD  Date:    07/24/2023  Time:    14:09               Narrative:    EXAMINATION:  US LOWER EXTREMITY VEINS RIGHT    CLINICAL HISTORY:  Pain in right leg    TECHNIQUE:  Duplex and color flow Doppler evaluation and graded compression of the right lower extremity veins was performed.    COMPARISON:  None    FINDINGS:  Right thigh veins: The common femoral, femoral, popliteal, upper greater saphenous, and deep femoral veins are patent and free of thrombus. The veins are normally compressible and have normal phasic flow and augmentation response.    Right calf veins: The visualized calf veins are patent.    Contralateral CFV: The contralateral (left) common femoral vein is patent and free of thrombus.    Miscellaneous: None                                       X-Ray Knee 3 View Right (Final result)  Result time 07/24/23 13:39:40      Final result by Kalen Parker MD (07/24/23 13:39:40)                   Impression:      1.  No acute displaced fracture or dislocation of the knee.      Electronically signed by: Kalen Parker MD  Date:    07/24/2023  Time:    13:39               Narrative:    EXAMINATION:  XR KNEE 3 VIEW RIGHT    CLINICAL HISTORY:  Pain in right knee    TECHNIQUE:  AP, lateral, and Merchant views of the right knee were performed.    COMPARISON:  None    FINDINGS:  Three views right knee.    There is osteopenia.  There are degenerative changes of the knee, tricompartmental.  No large knee joint effusion.  No acute displaced fracture or dislocation of the knee.                                       Medications   ketorolac injection 15 mg (15 mg Intramuscular Given 7/24/23 1314)     Medical Decision Making:   Initial Assessment:   Patient presents with concern of right-sided leg pain that has gradually worsened over past 2 weeks.  No injury trauma.  Works as a .  On exam, reproducible tenderness to medial right knee extending to right calf.  No overlying skin changes, erythema or warmth.  Neurovascular intact.  Differential Diagnosis:   DVT, PAD, cellulitis, arthritis, strain, sprain, spasm, tendinitis, radiculopathy  ED Management:  X-ray right knee, RLE venous ultrasound    Xray right knee per my interpretation is significant for degenerative changes but no acute bony abnormalities noted.  Images were reviewed by Radiology.  LLE venous ultrasound showing no signs of DVT.  With careful consideration, I do suspect symptoms are musculoskeletal and will plan to continue with conservative care.  Prescription for naproxen.  Encouraged RICE, activities and movements as tolerated with PCP follow-up.  ED return precautions were discussed.  Patient states her understanding and agrees with plan.                        Clinical Impression:   Final diagnoses:  [M79.604] Right leg pain  [M25.561] Right knee pain  [M17.11] Arthritis of knee, right (Primary)        ED Disposition Condition    Discharge Stable          ED  Prescriptions       Medication Sig Dispense Start Date End Date Auth. Provider    naproxen (NAPROSYN) 500 MG tablet Take 1 tablet (500 mg total) by mouth 2 (two) times daily with meals. 30 tablet 7/24/2023 -- Umberto Wood PA-C          Follow-up Information       Follow up With Specialties Details Why Contact Info    Your Doctor                 Umberto Wood PA-C  07/24/23 2031

## 2023-07-24 NOTE — ED TRIAGE NOTES
Patient states she has been having pain in her right thigh for the past 2 weeks. Taking Aleve with minimal relief. Yesterday began with right calf pain. Increased pain with weight bearing. No h/o injury. States she is a  and is on her feet all day. Denies CP or SOB. Presents in no distress.

## 2023-07-24 NOTE — Clinical Note
"Jacuqe "Jacque" Walker was seen and treated in our emergency department on 7/24/2023.  She may return to work on 07/27/2023.       If you have any questions or concerns, please don't hesitate to call.      Umberto Wood PA-C"

## 2024-07-22 ENCOUNTER — OFFICE VISIT (OUTPATIENT)
Dept: INTERNAL MEDICINE | Facility: CLINIC | Age: 64
End: 2024-07-22
Payer: MEDICARE

## 2024-07-22 ENCOUNTER — LAB VISIT (OUTPATIENT)
Dept: LAB | Facility: HOSPITAL | Age: 64
End: 2024-07-22
Attending: INTERNAL MEDICINE
Payer: MEDICARE

## 2024-07-22 VITALS
HEIGHT: 71 IN | DIASTOLIC BLOOD PRESSURE: 72 MMHG | WEIGHT: 255.75 LBS | BODY MASS INDEX: 35.81 KG/M2 | HEART RATE: 83 BPM | OXYGEN SATURATION: 97 % | SYSTOLIC BLOOD PRESSURE: 132 MMHG

## 2024-07-22 DIAGNOSIS — E05.00 GRAVES DISEASE: Primary | ICD-10-CM

## 2024-07-22 DIAGNOSIS — G40.909 SEIZURE DISORDER: ICD-10-CM

## 2024-07-22 DIAGNOSIS — E05.00 GRAVES DISEASE: ICD-10-CM

## 2024-07-22 DIAGNOSIS — E66.01 SEVERE OBESITY (BMI 35.0-39.9) WITH COMORBIDITY: ICD-10-CM

## 2024-07-22 DIAGNOSIS — M85.80 OSTEOPENIA, UNSPECIFIED LOCATION: ICD-10-CM

## 2024-07-22 DIAGNOSIS — E05.00 TOXIC DIFFUSE GOITER: ICD-10-CM

## 2024-07-22 DIAGNOSIS — Z11.59 ENCOUNTER FOR HEPATITIS C SCREENING TEST FOR LOW RISK PATIENT: ICD-10-CM

## 2024-07-22 DIAGNOSIS — R73.03 PREDIABETES: ICD-10-CM

## 2024-07-22 DIAGNOSIS — Z12.31 ENCOUNTER FOR SCREENING MAMMOGRAM FOR BREAST CANCER: ICD-10-CM

## 2024-07-22 DIAGNOSIS — Z71.89 ADVANCE CARE PLANNING: ICD-10-CM

## 2024-07-22 LAB
ALBUMIN SERPL BCP-MCNC: 3.6 G/DL (ref 3.5–5.2)
ALP SERPL-CCNC: 77 U/L (ref 55–135)
ALT SERPL W/O P-5'-P-CCNC: 23 U/L (ref 10–44)
ANION GAP SERPL CALC-SCNC: 7 MMOL/L (ref 8–16)
AST SERPL-CCNC: 17 U/L (ref 10–40)
BILIRUB DIRECT SERPL-MCNC: 0.1 MG/DL (ref 0.1–0.3)
BILIRUB SERPL-MCNC: 0.2 MG/DL (ref 0.1–1)
BUN SERPL-MCNC: 16 MG/DL (ref 8–23)
CALCIUM SERPL-MCNC: 9.2 MG/DL (ref 8.7–10.5)
CHLORIDE SERPL-SCNC: 108 MMOL/L (ref 95–110)
CHOLEST SERPL-MCNC: 198 MG/DL (ref 120–199)
CHOLEST/HDLC SERPL: 3.2 {RATIO} (ref 2–5)
CO2 SERPL-SCNC: 27 MMOL/L (ref 23–29)
CREAT SERPL-MCNC: 0.7 MG/DL (ref 0.5–1.4)
EST. GFR  (NO RACE VARIABLE): >60 ML/MIN/1.73 M^2
ESTIMATED AVG GLUCOSE: 100 MG/DL (ref 68–131)
GLUCOSE SERPL-MCNC: 82 MG/DL (ref 70–110)
HBA1C MFR BLD: 5.1 % (ref 4–5.6)
HCV AB SERPL QL IA: NORMAL
HDLC SERPL-MCNC: 62 MG/DL (ref 40–75)
HDLC SERPL: 31.3 % (ref 20–50)
LDLC SERPL CALC-MCNC: 120.2 MG/DL (ref 63–159)
NONHDLC SERPL-MCNC: 136 MG/DL
POTASSIUM SERPL-SCNC: 4.6 MMOL/L (ref 3.5–5.1)
PROT SERPL-MCNC: 7.1 G/DL (ref 6–8.4)
SODIUM SERPL-SCNC: 142 MMOL/L (ref 136–145)
T4 FREE SERPL-MCNC: 1 NG/DL (ref 0.71–1.51)
TRIGL SERPL-MCNC: 79 MG/DL (ref 30–150)
TSH SERPL DL<=0.005 MIU/L-ACNC: 0.39 UIU/ML (ref 0.4–4)

## 2024-07-22 PROCEDURE — 83036 HEMOGLOBIN GLYCOSYLATED A1C: CPT | Performed by: INTERNAL MEDICINE

## 2024-07-22 PROCEDURE — 36415 COLL VENOUS BLD VENIPUNCTURE: CPT | Mod: PO | Performed by: INTERNAL MEDICINE

## 2024-07-22 PROCEDURE — 99999 PR PBB SHADOW E&M-EST. PATIENT-LVL IV: CPT | Mod: PBBFAC,,, | Performed by: INTERNAL MEDICINE

## 2024-07-22 PROCEDURE — 3075F SYST BP GE 130 - 139MM HG: CPT | Mod: CPTII,S$GLB,, | Performed by: INTERNAL MEDICINE

## 2024-07-22 PROCEDURE — 84439 ASSAY OF FREE THYROXINE: CPT | Performed by: INTERNAL MEDICINE

## 2024-07-22 PROCEDURE — 3078F DIAST BP <80 MM HG: CPT | Mod: CPTII,S$GLB,, | Performed by: INTERNAL MEDICINE

## 2024-07-22 PROCEDURE — 1125F AMNT PAIN NOTED PAIN PRSNT: CPT | Mod: CPTII,S$GLB,, | Performed by: INTERNAL MEDICINE

## 2024-07-22 PROCEDURE — 3044F HG A1C LEVEL LT 7.0%: CPT | Mod: CPTII,S$GLB,, | Performed by: INTERNAL MEDICINE

## 2024-07-22 PROCEDURE — 80061 LIPID PANEL: CPT | Performed by: INTERNAL MEDICINE

## 2024-07-22 PROCEDURE — 1160F RVW MEDS BY RX/DR IN RCRD: CPT | Mod: CPTII,S$GLB,, | Performed by: INTERNAL MEDICINE

## 2024-07-22 PROCEDURE — 84443 ASSAY THYROID STIM HORMONE: CPT | Performed by: INTERNAL MEDICINE

## 2024-07-22 PROCEDURE — G2211 COMPLEX E/M VISIT ADD ON: HCPCS | Mod: S$GLB,,, | Performed by: INTERNAL MEDICINE

## 2024-07-22 PROCEDURE — 1101F PT FALLS ASSESS-DOCD LE1/YR: CPT | Mod: CPTII,S$GLB,, | Performed by: INTERNAL MEDICINE

## 2024-07-22 PROCEDURE — 1159F MED LIST DOCD IN RCRD: CPT | Mod: CPTII,S$GLB,, | Performed by: INTERNAL MEDICINE

## 2024-07-22 PROCEDURE — 80076 HEPATIC FUNCTION PANEL: CPT | Performed by: INTERNAL MEDICINE

## 2024-07-22 PROCEDURE — 86803 HEPATITIS C AB TEST: CPT | Performed by: INTERNAL MEDICINE

## 2024-07-22 PROCEDURE — 3288F FALL RISK ASSESSMENT DOCD: CPT | Mod: CPTII,S$GLB,, | Performed by: INTERNAL MEDICINE

## 2024-07-22 PROCEDURE — 99204 OFFICE O/P NEW MOD 45 MIN: CPT | Mod: S$GLB,,, | Performed by: INTERNAL MEDICINE

## 2024-07-22 PROCEDURE — 1158F ADVNC CARE PLAN TLK DOCD: CPT | Mod: CPTII,S$GLB,, | Performed by: INTERNAL MEDICINE

## 2024-07-22 PROCEDURE — 3008F BODY MASS INDEX DOCD: CPT | Mod: CPTII,S$GLB,, | Performed by: INTERNAL MEDICINE

## 2024-07-22 PROCEDURE — 80048 BASIC METABOLIC PNL TOTAL CA: CPT | Performed by: INTERNAL MEDICINE

## 2024-07-22 NOTE — PROGRESS NOTES
Advance Care Planning     Date: 07/22/2024    ACP Reviewed/No Changes  Voluntary advance care planning discussion had today with patient. Has not Previously completed HCPOA and Legal Will   Provided with information on this and she will bring back      A total of 1 min was spent on advance care planning, goals of care discussion, emotional support, formulating and communicating prognosis and exploring burden/benefit of various approaches of treatment. This discussion occurred on a fully voluntary basis with the verbal consent of the patient and/or family.

## 2024-07-22 NOTE — PROGRESS NOTES
Assessment:       1. Graves disease    2. Seizure disorder    3. Severe obesity (BMI 35.0-39.9) with comorbidity    4. Osteopenia, unspecified location    5. Prediabetes    6. Encounter for hepatitis C screening test for low risk patient    7. Encounter for screening mammogram for breast cancer    8. Toxic diffuse goiter    9. Advance care planning              Plan:             Jacque was seen today for establish care.    Diagnoses and all orders for this visit:    Graves disease  -     Lipid Panel; Future  -     TSH; Future    Seizure disorder  -     Lipid Panel; Future  -     Ambulatory referral/consult to Neurology; Future    Severe obesity (BMI 35.0-39.9) with comorbidity    Osteopenia, unspecified location  -     Lipid Panel; Future    Prediabetes  -     Basic Metabolic Panel; Future  -     Hemoglobin A1C; Future  -     Hepatic Function Panel; Future  -     Lipid Panel; Future    Encounter for hepatitis C screening test for low risk patient  -     Hepatitis C Antibody; Future    Encounter for screening mammogram for breast cancer  -     Mammo Digital Screening Bilat w/ Jorge Luis; Future    Toxic diffuse goiter    Advance care planning        Labs today  Referral to [facility]ane[facility]  Given acp paperwork  4 weeks ov     Subjective:       Patient ID: Jacque Burns is a 63 y.o. female.    Chief Complaint:   Establish Care      HPI    #Last visit/Previous Provider  This patient is new to me  Previously seen by Jessenia Moffett MD at [facility] [place]    Last visit was   Last CPE was       Link to History           #Interim Hx    Switching [facility]    #Concerns Today    Est care      #Chronic Problems    Patient Active Problem List   Diagnosis    Essential hypertension  Stable on amlodipnie      Obesity      Osteopenia  Taking MVI      Psoriasis  No recent flares  Not followed by derm      Seizure disorder  Seen by neuro  Last vsiit was 12/2022 at [facility]  Would like to switch to [facility]      MISHA (stress urinary incontinence, female)  "   Toxic diffuse goiter  Seen by endo prev on PTU   Now just monitored annual                Health Maintenance Due   Topic Date Due    Hepatitis C Screening  Never done    HIV Screening   Hepatitis C - neg 2016  HIV - neg 2018  Never done    Colorectal Cancer Screening  Never done    Shingles Vaccine (1 of 2) Never done    RSV Vaccine (Age 60+ and Pregnant patients) (1 - 1-dose 60+ series) Never done    COVID-19 Vaccine (5 - 2023-24 season) 01/29/2024    Mammogram  10/03/2024       Colonoscopy 12/07/2022:  - One less than 5 mm polyp in the ascending colon, removed with a jumbo cold forceps. Resected and  retrieved.  - One less than 5 mm polyp in the sigmoid colon, removed with a jumbo cold forceps. Resected and  retrieved.  - Diverticulosis in the sigmoid colon and in the ascending colon.  - The examined portion of the ileum was normal.  -Repeat colonoscopy in 10 years     Final Diagnosis   1. Colon, ascending polyp, biopsy:  - Hyperplastic polyp  - No features of dysplasia or malignancy identified     2. Colon, sigmoid polyp, biopsy:  - Colonic mucosa with superficial hyperplastic change and mucosal lymphoid aggregate  - No features of dysplasia or malignancy identified        Health Maintenance Topics with due status: Not Due       Topic Last Completion Date    TETANUS VACCINE 05/22/2019    Influenza Vaccine 09/29/2023    Hemoglobin A1c (Prediabetes) 03/06/2024    Lipid Panel 03/06/2024    DEXA Scan 04/30/2024             Tobacco Use: Low Risk  (7/22/2024)    Patient History     Smoking Tobacco Use: Never     Smokeless Tobacco Use: Never     Passive Exposure: Not on file                 Review of Systems   All other systems reviewed and are negative.        Objective:   /72 (BP Location: Right arm, Patient Position: Sitting, BP Method: Large (Manual))   Pulse 83   Ht 5' 11" (1.803 m)   Wt 116 kg (255 lb 11.7 oz)   SpO2 97%   BMI 35.67 kg/m²         7/22/2024     1:14 PM 7/24/2023    11:50 AM 5/12/2022 " "    6:02 PM 3/21/2022    11:20 AM 5/13/2020     7:16 PM   Vitals   Height 5' 11" (1.803 m)  5' 11" (1.803 m) 5' 11" (1.803 m) 5' 11" (1.803 m)   Weight (lbs) 255.73 255 250 250 250   BMI (kg/m2) 35.7  34.9 34.9 34.9            Physical Exam  Vitals and nursing note reviewed.   Constitutional:       General: She is not in acute distress.     Appearance: She is well-developed. She is not diaphoretic.   HENT:      Head: Normocephalic.      Nose: Nose normal.   Eyes:      General:         Right eye: No discharge.         Left eye: No discharge.      Conjunctiva/sclera: Conjunctivae normal.      Pupils: Pupils are equal, round, and reactive to light.   Cardiovascular:      Rate and Rhythm: Normal rate and regular rhythm.      Heart sounds: Normal heart sounds. No murmur heard.     No friction rub. No gallop.   Pulmonary:      Effort: Pulmonary effort is normal. No respiratory distress.   Abdominal:      General: Bowel sounds are normal. There is no distension.      Palpations: Abdomen is soft.   Musculoskeletal:         General: No deformity. Normal range of motion.      Cervical back: Normal range of motion.   Skin:     General: Skin is warm.   Neurological:      Mental Status: She is alert and oriented to person, place, and time.      Cranial Nerves: No cranial nerve deficit.             ASCVD  The 10-year ASCVD risk score (Lizette DK, et al., 2019) is: 9.4%    Values used to calculate the score:      Age: 63 years      Sex: Female      Is Non- : Yes      Diabetic: No      Tobacco smoker: No      Systolic Blood Pressure: 132 mmHg      Is BP treated: Yes      HDL Cholesterol: 64 mg/dL      Total Cholesterol: 198 mg/dL    Basic Labs  BMP  Lab Results   Component Value Date     12/06/2022    K 4.1 12/06/2022    CO2 30 12/06/2022    BUN 13.0 12/06/2022    CREATININE 0.65 12/06/2022    CALCIUM 9.8 12/06/2022    ESTGFRAFRICA 98 12/06/2022     Lab Results   Component Value Date    ALT 22 " "06/03/2022    AST 16 06/03/2022    BILITOT 0.4 06/03/2022       Lab Results   Component Value Date    TSH 0.75 03/06/2024       Lab Results   Component Value Date    WBC 8.1 03/06/2024    HGB 14.1 03/06/2024    HCT 44.8 03/06/2024    MCV 99.0 03/06/2024           STD  No results found for: "HIV1X2", "NXN18STYH"  No results found for: "RPR"  No results found for: "HAV", "HEPAIGM", "HEPBIGM", "HEPBCAB", "HBEAG", "HEPCAB"  No results found for: "LABNGO", "LABCHLA"    Hepatitis C - neg 2016  HIV - neg 2018     Lipids  Lab Results   Component Value Date    CHOL 198 03/06/2024     Lab Results   Component Value Date    HDL 64 (H) 03/06/2024     Lab Results   Component Value Date    LDLCALC 119 03/06/2024     Lab Results   Component Value Date    TRIG 75 03/06/2024     Lab Results   Component Value Date    CHOLHDL 3.09 03/06/2024       DM  Lab Results   Component Value Date    HGBA1C 5.7 (H) 03/06/2024           Future Appointments   Date Time Provider Department Center   8/19/2024  8:00 AM Han Matos III, MD [facility] IM [place]   10/7/2024 10:30 AM [facility] MAMMO1 [facility] MAMMO [facility]   10/29/2024  2:20 PM Robe Rendon MD [facility] NEURO [facility]             Medication List with Changes/Refills   Current Medications    AMLODIPINE (NORVASC) 10 MG TABLET    Take 10 mg by mouth once daily.    CETIRIZINE (ZYRTEC) 5 MG TABLET    Take 1 tablet (5 mg total) by mouth every 12 (twelve) hours. for 7 days    LEVETIRACETAM (KEPPRA) 500 MG TAB    Take 500 mg by mouth 2 (two) times daily.    NAPROXEN (NAPROSYN) 500 MG TABLET    Take 1 tablet (500 mg total) by mouth 2 (two) times daily with meals.    TRIAMCINOLONE ACETONIDE 0.1% (KENALOG) 0.1 % OINTMENT    Apply topically 2 (two) times daily.       Disclaimer:  This note has been generated using voice-recognition software. There may be grammatical or spelling errors that have been missed during proof-reading Visit complexity inherent to evaluation and management associated with " medical care services that serve as the continuing focal point for all needed health care services and/or with medical care services that are part of ongoing care related to a patient's single, serious condition or a complex condition

## 2024-08-19 ENCOUNTER — OFFICE VISIT (OUTPATIENT)
Dept: INTERNAL MEDICINE | Facility: CLINIC | Age: 64
End: 2024-08-19
Payer: MEDICARE

## 2024-08-19 ENCOUNTER — E-CONSULT (OUTPATIENT)
Dept: ENDOCRINOLOGY | Facility: CLINIC | Age: 64
End: 2024-08-19
Payer: MEDICARE

## 2024-08-19 VITALS
SYSTOLIC BLOOD PRESSURE: 124 MMHG | BODY MASS INDEX: 36.02 KG/M2 | WEIGHT: 257.25 LBS | HEART RATE: 79 BPM | HEIGHT: 71 IN | OXYGEN SATURATION: 99 % | DIASTOLIC BLOOD PRESSURE: 82 MMHG

## 2024-08-19 DIAGNOSIS — R73.03 PREDIABETES: ICD-10-CM

## 2024-08-19 DIAGNOSIS — I10 ESSENTIAL HYPERTENSION: Primary | ICD-10-CM

## 2024-08-19 DIAGNOSIS — R79.89 LOW TSH LEVEL: Primary | ICD-10-CM

## 2024-08-19 DIAGNOSIS — E05.00 TOXIC DIFFUSE GOITER: ICD-10-CM

## 2024-08-19 DIAGNOSIS — R79.89 LOW TSH LEVEL: ICD-10-CM

## 2024-08-19 DIAGNOSIS — M54.31 SCIATICA, RIGHT SIDE: ICD-10-CM

## 2024-08-19 PROCEDURE — 1126F AMNT PAIN NOTED NONE PRSNT: CPT | Mod: CPTII,S$GLB,, | Performed by: INTERNAL MEDICINE

## 2024-08-19 PROCEDURE — 3074F SYST BP LT 130 MM HG: CPT | Mod: CPTII,S$GLB,, | Performed by: INTERNAL MEDICINE

## 2024-08-19 PROCEDURE — 3079F DIAST BP 80-89 MM HG: CPT | Mod: CPTII,S$GLB,, | Performed by: INTERNAL MEDICINE

## 2024-08-19 PROCEDURE — 99451 NTRPROF PH1/NTRNET/EHR 5/>: CPT | Mod: S$GLB,,, | Performed by: STUDENT IN AN ORGANIZED HEALTH CARE EDUCATION/TRAINING PROGRAM

## 2024-08-19 PROCEDURE — 99999 PR PBB SHADOW E&M-EST. PATIENT-LVL IV: CPT | Mod: PBBFAC,,, | Performed by: INTERNAL MEDICINE

## 2024-08-19 PROCEDURE — 1101F PT FALLS ASSESS-DOCD LE1/YR: CPT | Mod: CPTII,S$GLB,, | Performed by: INTERNAL MEDICINE

## 2024-08-19 PROCEDURE — 3044F HG A1C LEVEL LT 7.0%: CPT | Mod: CPTII,S$GLB,, | Performed by: INTERNAL MEDICINE

## 2024-08-19 PROCEDURE — G2211 COMPLEX E/M VISIT ADD ON: HCPCS | Mod: S$GLB,,, | Performed by: INTERNAL MEDICINE

## 2024-08-19 PROCEDURE — 3288F FALL RISK ASSESSMENT DOCD: CPT | Mod: CPTII,S$GLB,, | Performed by: INTERNAL MEDICINE

## 2024-08-19 PROCEDURE — 99214 OFFICE O/P EST MOD 30 MIN: CPT | Mod: S$GLB,,, | Performed by: INTERNAL MEDICINE

## 2024-08-19 PROCEDURE — 1159F MED LIST DOCD IN RCRD: CPT | Mod: CPTII,S$GLB,, | Performed by: INTERNAL MEDICINE

## 2024-08-19 PROCEDURE — 1160F RVW MEDS BY RX/DR IN RCRD: CPT | Mod: CPTII,S$GLB,, | Performed by: INTERNAL MEDICINE

## 2024-08-19 PROCEDURE — 3008F BODY MASS INDEX DOCD: CPT | Mod: CPTII,S$GLB,, | Performed by: INTERNAL MEDICINE

## 2024-08-19 NOTE — PROGRESS NOTES
Assessment:         1. Low TSH level    2. Prediabetes    3. Toxic diffuse goiter          Plan:           Jacque was seen today for follow-up.    Diagnoses and all orders for this visit:    Essential hypertension  Chronic  Controlled  Patient is at goal today   I have reviewed lifestyle modification to achieve/maintain goals  We will continue the current medication regimen as listed below  Patient will follow up in 6 months     Low TSH level  -     E-Consult to Endocrinology    Prediabetes  -     Hemoglobin A1C; Standing    Toxic diffuse goiter  -     Basic Metabolic Panel; Standing  -     Hepatic Function Panel; Standing  -     TSH; Standing    Sciatica, right side  -     Ambulatory referral/consult to Physical/Occupational Therapy; Future        Graves  -econsult endo     Fu in 6 month for htn     Pt/ot for sciatica , try tylenol     Mammo scheduled       Subjective:       Patient ID: Jacque Burns is a 63 y.o. female.    Chief Complaint: Follow-up      Interim Hx  Last seen by me in July     Concerns today  mammo scheduled    labs  - low tsh not on synthroid   - previous graves     Chronic problems      Hypertension  This is a chronic problem. The current episode started more than 1 year ago. Past treatments include calcium channel blockers. The current treatment provides significant improvement. There are no compliance problems.    Seizures   This is a chronic (will est care with neurology) problem.       Review of Systems   Neurological:  Positive for seizures.   All other systems reviewed and are negative.          Health Maintenance Due   Topic Date Due    HIV Screening  Never done    Shingles Vaccine (1 of 2) Never done    RSV Vaccine (Age 60+ and Pregnant patients) (1 - 1-dose 60+ series) Never done    COVID-19 Vaccine (5 - 2023-24 season) 01/29/2024    Mammogram  10/03/2024         Objective:     /82 (BP Location: Right arm, Patient Position: Sitting, BP Method: Large (Manual))   Pulse 79    "Ht 5' 11" (1.803 m)   Wt 116.7 kg (257 lb 4.4 oz)   SpO2 99%   BMI 35.88 kg/m²         8/19/2024     8:16 AM 7/22/2024     1:14 PM 7/24/2023    11:50 AM 5/12/2022     6:02 PM 3/21/2022    11:20 AM   Vitals   Height 5' 11" (1.803 m) 5' 11" (1.803 m)  5' 11" (1.803 m) 5' 11" (1.803 m)   Weight (lbs) 257.28 255.73 255 250 250   BMI (kg/m2) 35.9 35.7  34.9 34.9            Physical Exam  Vitals and nursing note reviewed.   Constitutional:       General: She is not in acute distress.     Appearance: She is well-developed. She is not diaphoretic.   HENT:      Head: Normocephalic.      Nose: Nose normal.   Eyes:      General:         Right eye: No discharge.         Left eye: No discharge.      Conjunctiva/sclera: Conjunctivae normal.      Pupils: Pupils are equal, round, and reactive to light.   Cardiovascular:      Rate and Rhythm: Normal rate and regular rhythm.      Heart sounds: Normal heart sounds. No murmur heard.     No friction rub. No gallop.   Pulmonary:      Effort: Pulmonary effort is normal. No respiratory distress.   Abdominal:      General: Bowel sounds are normal. There is no distension.      Palpations: Abdomen is soft.   Musculoskeletal:         General: No deformity. Normal range of motion.      Cervical back: Normal range of motion and neck supple. No rigidity or tenderness.   Skin:     General: Skin is warm.   Neurological:      Mental Status: She is alert and oriented to person, place, and time.      Cranial Nerves: No cranial nerve deficit.           No results found for this or any previous visit (from the past 336 hour(s)).      Future Appointments   Date Time Provider Department Center   10/7/2024 10:30 AM [facility] MAMMO1 [facility] MAMMO [facility]   10/29/2024  2:20 PM Robe Rendon MD [facility] NEURO [place] [facility]       Medication List with Changes/Refills   Current Medications    AMLODIPINE (NORVASC) 10 MG TABLET    Take 10 mg by mouth once daily.    CETIRIZINE (ZYRTEC) 5 MG TABLET    Take 1 tablet " (5 mg total) by mouth every 12 (twelve) hours. for 7 days    LEVETIRACETAM (KEPPRA) 500 MG TAB    Take 500 mg by mouth 2 (two) times daily.    NAPROXEN (NAPROSYN) 500 MG TABLET    Take 1 tablet (500 mg total) by mouth 2 (two) times daily with meals.    TRIAMCINOLONE ACETONIDE 0.1% (KENALOG) 0.1 % OINTMENT    Apply topically 2 (two) times daily.         Disclaimer:  This note has been generated using voice-recognition software. There may be grammatical or spelling errors that have been missed during proof-reading

## 2024-08-19 NOTE — CONSULTS
[facility] - [facility] 6th Fl  Response for E-Consult     Patient Name: Jacque Burns  MRN: [id number]  Primary Care Provider: Han Matos III, MD   Requesting Provider: Han Matos III, MD  E-Consult to Endocrinology  Consult performed by: Farrah Acevedo MD  Consult ordered by: Han Matos III, MD          Recommendation: One very minimally low TSH with normal fT4. All prior TSH labs on the low side of normal. Before making a diagnosis of subclinical hyperthoidism, recommend rechecking TSH, fT4, T3, as well as thyrotropin receptor antibody (TRAb).    ::::::::::::::::::::::::::::::::::General Information::::::::::::::::::::::::::::::::::::::::::::::::::::::::::::::::::::::    In the context of new onset hyperthyroidism in a non pregnant individual, symptomatic treatment can be addressed with a beta blocker immediately.      If there is thyroid tenderness, would check a CRP and if elevated would treat as painful thyroiditis with NSAIDs plus or minus prednisone.    If there is no thyroid tenderness and the patient has overt signs of Graves, including Graves orbitopathy large goiter, methimazole can be started immediately (if there are no concerns for pregnancy).    If there is no thyroid tenderness and the patient does not have overt signs of Graves, would check a TSH receptor antibody and if the antibody is negative proceed with a thyroid scan and uptake as this would determine next steps.    If the TSH receptor antibody is positive, would treat as Graves by starting methimazole.      After initial evaluation is complete,  would refer to Endocrine for definitive management.  With a thyroiditis, full resolution is expected.  With Graves disease medical management versus I 131 therapy is the usual course.  With a toxic nodule or toxic multinodular goiter,  the treatment is generally I 131.    If the patient is pregnant, on amiodarone, or has had contrast in the past few months, would immediately  refer to endocrinology without a thyroid scan and uptake      Contingency that warrants a repeat eConsult or referral: if repeat labs c/w hyperthyroidism and you would like assistance in management.    Total time of Consultation: 5 minute    I did not speak to the requesting provider verbally about this.     *This eConsult is based on the clinical data available to me and is furnished without benefit of a physical examination. The eConsult will need to be interpreted in light of any clinical issues or changes in patient status not available to me at the time of filing this eConsults. Significant changes in patient condition or level of acuity should result in immediate formal consultation and reevaluation. Please alert me if you have further questions.    Thank you for this eConsult referral.     MD [facility] Mcdonald - [facility]

## 2024-08-20 ENCOUNTER — PATIENT MESSAGE (OUTPATIENT)
Dept: INTERNAL MEDICINE | Facility: CLINIC | Age: 64
End: 2024-08-20
Payer: MEDICARE

## 2024-08-20 DIAGNOSIS — R79.89 LOW TSH LEVEL: Primary | ICD-10-CM

## 2024-08-20 DIAGNOSIS — R94.6 ABNORMAL RESULTS OF THYROID FUNCTION STUDIES: ICD-10-CM

## 2024-08-20 NOTE — TELEPHONE ENCOUNTER
Diagnoses and all orders for this visit:    Low TSH level  -     TSH; Future  -     T3; Future  -     Thyrotropin Receptor Antibody; Future    Abnormal results of thyroid function studies  -     TSH; Future

## 2024-08-26 ENCOUNTER — CLINICAL SUPPORT (OUTPATIENT)
Dept: REHABILITATION | Facility: HOSPITAL | Age: 64
End: 2024-08-26
Attending: INTERNAL MEDICINE
Payer: MEDICARE

## 2024-08-26 DIAGNOSIS — M79.651 RIGHT THIGH PAIN: Primary | ICD-10-CM

## 2024-08-26 DIAGNOSIS — R29.898 DECREASED STRENGTH OF LOWER EXTREMITY: ICD-10-CM

## 2024-08-26 DIAGNOSIS — R26.89 IMPAIRED GAIT AND MOBILITY: ICD-10-CM

## 2024-08-26 PROCEDURE — 97162 PT EVAL MOD COMPLEX 30 MIN: CPT | Mod: PN

## 2024-08-26 PROCEDURE — 97112 NEUROMUSCULAR REEDUCATION: CPT | Mod: PN

## 2024-08-26 NOTE — PLAN OF CARE
[facility]   Physical Therapy Initial Evaluation      Name: Jacque Burns  Clinic Number: [id number]    Therapy Diagnosis:   Encounter Diagnoses   Name Primary?    Right thigh pain Yes    Impaired gait and mobility     Decreased strength of lower extremity         Physician: Han Matos III, MD    Physician Orders: PT Eval and Treat   Medical Diagnosis from Referral: Sciatica, right side [M54.31]   Evaluation Date: 8/26/2024  Authorization Period Expiration: 12/31/2024  Plan of Care Expiration: 10/10/2024  Progress Note Due: 9/26/2024  Visit # / Visits authorized: 1/1   FOTO: 1/5    Precautions:  Standard, type 2 diabetes, osteopenia    Time In: 0816  Time Out: 0900  Total Billable Time: 44 minutes    Subjective     Date of onset: 9 months     History of current condition - Jacque reports: insidious onset of right anterior thigh pain from ASIS to patella. Patient denies weakness, numbness or tingling. Patient denies prior work history involving weighted belt. Patient reports diagnosis of arthritis at multiple sites, particularly affecting right knee. Patient also reports onset of left arm numbness into posterior forearm beginning this Saturday. This only lasts for a few seconds. Patient denies associated arm weakness or dropping of items in left hand. Patient also denies facial drooping or slurred speech.     Falls: None    Imaging: None for low back within the last 3 years    Prior Therapy: Never  Social History: Patient lives in raised house. Patient reports navigating with step to pattern challenging left lower extremity secondary to right knee pain and arthritis  Occupation: Patient is on social security  Prior Level of Function: Independent   Current Level of Function: Independent    Pain:  Current 0/10, worst 5/10, best 0/10   Location: Left anterior thigh (ASIS to patella)  Description: Ache  Aggravating Factors: Active hip flexion and return to resting, washing  dishes  Easing Factors: Leaning forward while washing dishes    Patients goals: Eliminate pain     Medical History:   No past medical history on file.    Surgical History:   Jacque Burns  has no past surgical history on file.    Medications:   Jacque has a current medication list which includes the following prescription(s): amlodipine, cetirizine, levetiracetam, naproxen, and triamcinolone acetonide 0.1%.    Allergies:   Review of patient's allergies indicates:   Allergen Reactions    Iodine Other (See Comments) and Swelling    Shellfish containing products Swelling      Objective      Within normal limits=WNL  Not tested=NT    Posture: Standing: Bilateral genu and rearfoot valgus and toe out  Palpation: No tenderness to palpation of anterior thigh, pelvis or knee    Lumbar Active range of motion  Pain/dysfunction with movement:   Flexion 55    Extension 25    Right side bending 35    Left side bending 15    Right rotation 47    Left rotation 54      Hip Right  Left  Pain/dysfunction with movement    Active range of motion  Passive range of motion  Active range of motion  Passive range of motion     Flexion WNL NT WNL NT    Extension WNL NT WNL NT    Abduction WNL NT WNL NT    Adduction WNL NT WNL NT    Internal rotation 25% 50% 25% 50%    External rotation WNL NT WNL NT       Knee Right  Left  Pain/dysfunction with movement    Active range of motion  Passive range of motion  Active range of motion  Passive range of motion     Flexion 110  NT    Extension -5 NT -5 NT       Lower extremity manual muscle tests  Right Left Pain/dysfunction with movement   Hip flexion 4-/5 4+/5    Hip extension 3-/5 3-/5    Hip abduction 3+/5 3+/5    Hip adduction 3+/5 4-/5    Hip internal rotation 4+/5 4+/5    Hip external rotation 4+/5 4/5    Knee flexion 5/5 5/5    Knee extension 5/5 5/5    Ankle dorsiflexion 4+/5 4/5    Ankle plantarflexion 5/5 5/5      Joint mobility:   Lumbar: Normal with initial guarding. No  radicular pain into right thigh with central or unilateral posterior to anterior mobilizations at L1-5    Gross movement: Reciprocal stair navigation: Step to pattern favoring left lower extremity and bilateral shoulder shrug    Flexibility: Forward fold: Full range    Special tests: Slump (-) bilateral   Straight leg raise (-) bilateral   FADIR (-) right  Scour (-) right     Gait analysis: Right knee valgus collapse and pronation at initial contact/loading response    Intake Outcome Measure for FOTO Lumbar Spine Survey    Therapist reviewed FOTO scores for Jacque Burns on 8/26/2024.   FOTO report - see Media section or FOTO account episode details.    Intake Score: 51%     Treatment     Total Treatment time (time-based codes) separate from Evaluation: 10 minutes     Jacque received the treatments listed below:      neuromuscular re-education activities to improve: Coordination, Kinesthetic, and Posture for 10 minutes, including education:    Sidelying clams: 10x right  Supine march: 10x bilateral     Patient Education and Home Exercises     Education provided:   - findings; prognosis and plan of care   - home exercise program     Written Home Exercises Provided: On 8/26/2024. Exercises were reviewed and Jacque was able to demonstrate them prior to the end of the session.  Jacque demonstrated good  understanding of the education provided. See EMR under Patient Instructions for exercises provided during therapy sessions.    Assessment     Jacque is a 63 y.o. female referred to outpatient Physical Therapy with a medical diagnosis of sciatica of right side. Patient presents with chronic anterior thigh pain along course of rectus femoris. Aggravating and alleviating factors consistent with muscle action. Possible that knee joint integrity and valgus collapse contribute.     Patient prognosis is Fair- Good  Patient will benefit from skilled outpatient Physical Therapy to address the deficits stated above and in the  chart below, provide patient /family education, and to maximize patientt's level of independence.     Plan of care discussed with patient: Yes  Patient's spiritual, cultural and educational needs considered and patient is agreeable to the plan of care and goals as stated below:     Anticipated Barriers for therapy: Co-morbidities    Medical Necessity is demonstrated by the following  History  Co-morbidities and personal factors that may impact the plan of care [] LOW: no personal factors / co-morbidities  [] MODERATE: 1-2 personal factors / co-morbidities  [x] HIGH: 3+ personal factors / co-morbidities    Moderate / High Support Documentation:   Co-morbidities affecting plan of care: Arthritis, Back pain, BMI over 30, High Blood Pressure, Seizures    Personal Factors:   lifestyle     Examination  Body Structures and Functions, activity limitations and participation restrictions that may impact the plan of care [] LOW: addressing 1-2 elements  [x] MODERATE: 3+ elements  [] HIGH: 4+ elements (please support below)    Moderate / High Support Documentation: Above     Clinical Presentation [] LOW: stable  [x] MODERATE: Evolving  [] HIGH: Unstable     Decision Making/ Complexity Score: moderate       Short Term Goals (3 Weeks):  1. Patient will be compliant with home exercise program to supplement therapy in promoting functional mobility.  2. Patient will perform standing march with good control to demonstrate improved hip flexor strength.  4. Patient will improve impaired lower extremity manual muscle tests  to >/=4/5 to improve strength for functional tasks.  Long Term Goals (6 Weeks):   1. Patient will improve FOTO score to >/= 63% to decrease perceived limitation with maintaining/changing body position.   2. Patient will perform banded march with good control to demonstrate improved core strength.  3. Patient will report no pain while washing dishes to promote household independence.    Plan     Plan of care  Certification: 8/26/2024 to 10/11/2024.    Outpatient Physical Therapy 2 times weekly for 6 weeks to include the following interventions: Cervical/Lumbar Traction, Electrical Stimulation -, Gait Training, Manual Therapy, Moist Heat/ Ice, Neuromuscular Re-ed, Patient Education, Self Care, Therapeutic Activities, and Therapeutic Exercise.    Nessa Brody, PT, DPT, OCS        Physician's Signature: _________________________________________ Date: ________________

## 2024-08-27 PROBLEM — R26.89 IMPAIRED GAIT AND MOBILITY: Status: ACTIVE | Noted: 2024-08-27

## 2024-08-27 PROBLEM — M79.651 RIGHT THIGH PAIN: Status: ACTIVE | Noted: 2024-08-27

## 2024-08-27 PROBLEM — R29.898 DECREASED STRENGTH OF LOWER EXTREMITY: Status: ACTIVE | Noted: 2024-08-27

## 2024-10-01 ENCOUNTER — LAB VISIT (OUTPATIENT)
Dept: LAB | Facility: HOSPITAL | Age: 64
End: 2024-10-01
Attending: INTERNAL MEDICINE
Payer: MEDICARE

## 2024-10-01 DIAGNOSIS — R94.6 ABNORMAL RESULTS OF THYROID FUNCTION STUDIES: ICD-10-CM

## 2024-10-01 DIAGNOSIS — R79.89 LOW TSH LEVEL: ICD-10-CM

## 2024-10-01 LAB
T3 SERPL-MCNC: 75 NG/DL (ref 60–180)
TSH SERPL DL<=0.005 MIU/L-ACNC: 0.47 UIU/ML (ref 0.4–4)

## 2024-10-01 PROCEDURE — 83520 IMMUNOASSAY QUANT NOS NONAB: CPT | Performed by: INTERNAL MEDICINE

## 2024-10-01 PROCEDURE — 84480 ASSAY TRIIODOTHYRONINE (T3): CPT | Performed by: INTERNAL MEDICINE

## 2024-10-01 PROCEDURE — 84443 ASSAY THYROID STIM HORMONE: CPT | Performed by: INTERNAL MEDICINE

## 2024-10-03 LAB — TSH RECEP AB SER-ACNC: <1.1 IU/L (ref 0–1.75)

## 2024-10-07 ENCOUNTER — HOSPITAL ENCOUNTER (OUTPATIENT)
Dept: RADIOLOGY | Facility: HOSPITAL | Age: 64
Discharge: HOME | End: 2024-10-07
Attending: INTERNAL MEDICINE
Payer: MEDICARE

## 2024-10-07 DIAGNOSIS — Z12.31 ENCOUNTER FOR SCREENING MAMMOGRAM FOR BREAST CANCER: ICD-10-CM

## 2024-10-07 PROCEDURE — 77067 SCR MAMMO BI INCL CAD: CPT | Mod: TC

## 2024-10-24 ENCOUNTER — TELEPHONE (OUTPATIENT)
Dept: NEUROLOGY | Facility: CLINIC | Age: 64
End: 2024-10-24
Payer: MEDICARE

## 2024-10-24 NOTE — TELEPHONE ENCOUNTER
I called patient on today at 2:25 Pm to let her know that I had to reschedule her appointment that was scheduled on 10- at 2:20 PM. I rescheduled it to 11- at 8:40 AM at the [place] Location. I also let the address to the clinic. If she has any questions please call.

## 2024-10-31 ENCOUNTER — TELEPHONE (OUTPATIENT)
Dept: FAMILY MEDICINE | Facility: CLINIC | Age: 64
End: 2024-10-31
Payer: MEDICARE

## 2024-11-01 ENCOUNTER — TELEPHONE (OUTPATIENT)
Dept: FAMILY MEDICINE | Facility: CLINIC | Age: 64
End: 2024-11-01
Payer: MEDICARE

## 2024-11-05 ENCOUNTER — OFFICE VISIT (OUTPATIENT)
Dept: NEUROLOGY | Facility: CLINIC | Age: 64
End: 2024-11-05
Payer: MEDICARE

## 2024-11-05 ENCOUNTER — LAB VISIT (OUTPATIENT)
Dept: LAB | Facility: HOSPITAL | Age: 64
End: 2024-11-05
Attending: STUDENT IN AN ORGANIZED HEALTH CARE EDUCATION/TRAINING PROGRAM
Payer: MEDICARE

## 2024-11-05 VITALS
DIASTOLIC BLOOD PRESSURE: 80 MMHG | WEIGHT: 254.88 LBS | SYSTOLIC BLOOD PRESSURE: 120 MMHG | HEIGHT: 71 IN | HEART RATE: 80 BPM | BODY MASS INDEX: 35.68 KG/M2

## 2024-11-05 DIAGNOSIS — G40.909 SEIZURE DISORDER: ICD-10-CM

## 2024-11-05 DIAGNOSIS — G40.909 SEIZURE DISORDER: Primary | ICD-10-CM

## 2024-11-05 PROCEDURE — 1101F PT FALLS ASSESS-DOCD LE1/YR: CPT | Mod: CPTII,S$GLB,, | Performed by: STUDENT IN AN ORGANIZED HEALTH CARE EDUCATION/TRAINING PROGRAM

## 2024-11-05 PROCEDURE — 1159F MED LIST DOCD IN RCRD: CPT | Mod: CPTII,S$GLB,, | Performed by: STUDENT IN AN ORGANIZED HEALTH CARE EDUCATION/TRAINING PROGRAM

## 2024-11-05 PROCEDURE — 3044F HG A1C LEVEL LT 7.0%: CPT | Mod: CPTII,S$GLB,, | Performed by: STUDENT IN AN ORGANIZED HEALTH CARE EDUCATION/TRAINING PROGRAM

## 2024-11-05 PROCEDURE — 99999 PR PBB SHADOW E&M-EST. PATIENT-LVL III: CPT | Mod: PBBFAC,,, | Performed by: STUDENT IN AN ORGANIZED HEALTH CARE EDUCATION/TRAINING PROGRAM

## 2024-11-05 PROCEDURE — 3288F FALL RISK ASSESSMENT DOCD: CPT | Mod: CPTII,S$GLB,, | Performed by: STUDENT IN AN ORGANIZED HEALTH CARE EDUCATION/TRAINING PROGRAM

## 2024-11-05 PROCEDURE — 3079F DIAST BP 80-89 MM HG: CPT | Mod: CPTII,S$GLB,, | Performed by: STUDENT IN AN ORGANIZED HEALTH CARE EDUCATION/TRAINING PROGRAM

## 2024-11-05 PROCEDURE — 3008F BODY MASS INDEX DOCD: CPT | Mod: CPTII,S$GLB,, | Performed by: STUDENT IN AN ORGANIZED HEALTH CARE EDUCATION/TRAINING PROGRAM

## 2024-11-05 PROCEDURE — 80177 DRUG SCRN QUAN LEVETIRACETAM: CPT | Performed by: STUDENT IN AN ORGANIZED HEALTH CARE EDUCATION/TRAINING PROGRAM

## 2024-11-05 PROCEDURE — 36415 COLL VENOUS BLD VENIPUNCTURE: CPT | Performed by: STUDENT IN AN ORGANIZED HEALTH CARE EDUCATION/TRAINING PROGRAM

## 2024-11-05 PROCEDURE — 3074F SYST BP LT 130 MM HG: CPT | Mod: CPTII,S$GLB,, | Performed by: STUDENT IN AN ORGANIZED HEALTH CARE EDUCATION/TRAINING PROGRAM

## 2024-11-05 PROCEDURE — 99204 OFFICE O/P NEW MOD 45 MIN: CPT | Mod: S$GLB,,, | Performed by: STUDENT IN AN ORGANIZED HEALTH CARE EDUCATION/TRAINING PROGRAM

## 2024-11-05 RX ORDER — LEVETIRACETAM 500 MG/1
500 TABLET ORAL DAILY
Qty: 60 TABLET | Refills: 5 | Status: SHIPPED | OUTPATIENT
Start: 2024-11-05 | End: 2025-10-31

## 2024-11-05 NOTE — PROGRESS NOTES
[facility]  Clinic Note    Date of Service: 11/5/2024  Patient seen at the request of: Han Matos III, MD    Reason for Consultation  seizure    Assessment:  Jacque Burns is a 63 y.o. female who presents with seizure disorder.      Patient presents to [facility] care for a history of epilepsy.  She reports seizure onset at 14 years old.  She reports seizures would start with potential dystonia and tremor in the left upper extremity followed by loss of consciousness and convulsion.  At their worst, seizures were occurring daily.  She was initially controlled on phenytoin which was switched to Keppra in 2019.  Since then, Keppra 500 twice daily was decreased to Keppra 500 once daily at least two years ago.  Patient reports that she occasionally misses doses of the Keppra 500 once daily as well.  Even so, the patient has been seizure-free since 2010.    We briefly discussed the possibility of discontinuing Keppra, however, patient feels safer continuing the medication at this time.    Per read, outside MRI brain did not show any lesions which would increase risk of seizure.  No significant risk factors on history other than potential family history.    Patient asked for a refill of her triamcinolone cream.  I reached out to her primary Dr. Matos concerning this request.      Plan:    - continue keppra 500mg daily  - keppra level  - request of MRI brain from [facility]    - [facility] in 6 months    A dictation device was used to produce this document. Use of such devices sometimes results in grammatical errors or replacement of words that sound similarly.     Signed:    Robe Rendon MD  Neurology/Epilepsy  11/05/2024 7:48 AM      HPI:  Jacque Burns is a 63 y.o. female with history of epilepsy    Patient went down to keprpa 500mg once daily at least a couple of years ago.      First seizure: 14 years old  Last seizure: 1/2/2010    Semiology:   1st type: Start with left hand flexion and tremor and then  loss of consciousness.  This was followed by urinary urgency.  - Aura: none  - Triggers: none  - Frequency: at worst every day  - Duration: minutes  - Maximum time seizure free: since 2010    Status epilepticus: none    Seizure Risk Factors:   Birth history: full term  Developmental history: normal  Febrile seizure: none  CNS infection: none  Head trauma: none  Family history: maybe aunt    Previous Anti-Epileptic Medication:    Current medications: keppra 500mg once daily  Past medications: dilantin (switched to keppra in 2019)  Driving: yes    This is the extent of the patient's complaints at this time.    TSH   Date Value Ref Range Status   10/01/2024 0.472 0.400 - 4.000 uIU/mL Final   07/22/2024 0.385 (L) 0.400 - 4.000 uIU/mL Final     Hemoglobin A1C   Date Value Ref Range Status   07/22/2024 5.1 4.0 - 5.6 % Final     Comment:     ADA Screening Guidelines:  5.7-6.4%  Consistent with prediabetes  >or=6.5%  Consistent with diabetes    High levels of fetal hemoglobin interfere with the HbA1C  assay. Heterozygous hemoglobin variants (HbS, HgC, etc)do  not significantly interfere with this assay.   However, presence of multiple variants may affect accuracy.     03/06/2024 5.7 (H) 4.7 - 5.6 % Final   06/25/2020 5.2 4.7 - 5.6 % Final         Review of Systems:  ROS negative unless noted in HPI    Past Surgical History:  History reviewed. No pertinent surgical history.    Family History:  Family History   Problem Relation Name Age of Onset    Heart failure Mother      Colon cancer Neg Hx      Breast cancer Neg Hx      Ovarian cancer Neg Hx         Social History:  Social History     Tobacco Use    Smoking status: Never    Smokeless tobacco: Never   Substance Use Topics    Alcohol use: Never    Drug use: Never       Allergies:  Iodine and Shellfish containing products    Outpatient Medications:  Prior to Admission medications    Medication Sig Start Date End Date Taking? Authorizing Provider   amLODIPine (NORVASC) 10 MG  "tablet Take 10 mg by mouth once daily. 12/15/21   Provider, Historical   cetirizine (ZYRTEC) 5 MG tablet Take 1 tablet (5 mg total) by mouth every 12 (twelve) hours. for 7 days  Patient not taking: Reported on 7/22/2024 3/21/22 3/28/22  Kenya Mccallum PA-C   levETIRAcetam (KEPPRA) 500 MG Tab Take 500 mg by mouth 2 (two) times daily. 2/6/22   Provider, Historical   naproxen (NAPROSYN) 500 MG tablet Take 1 tablet (500 mg total) by mouth 2 (two) times daily with meals.  Patient not taking: Reported on 8/19/2024 7/24/23   Umberto Wood PA-C   triamcinolone acetonide 0.1% (KENALOG) 0.1 % ointment Apply topically 2 (two) times daily.  Patient not taking: Reported on 3/21/2022 5/13/20   Teri Kearns MD       Physical exam:    Vitals: /80   Pulse 80   Ht 5' 11" (1.803 m)   Wt 115.6 kg (254 lb 13.6 oz)   BMI 35.54 kg/m²     General:   Sitting in chair, in no distress, well-nourished, well-developed, appears stated age.  Head/Neck:   Normocephalic,atraumatic  Pulm:  Non-labored breathing     Mental Status: Alert and oriented to person, time, place, situation. Speech spontaneous and fluent without paraphasias; no dysarthria  CN:  II: visual fields full  III, IV, VI: EOM intact without nystagmus or diplopia.   V: Sensation to light touch full and symmetric in V1-3. [place] contraction full bilaterally.   VII: Facial movement full and symmetric.   VIII: Hearing grossly normal to conversation.  IX, X: Palate midline with symmetric elevation.    XI: SCM and trapezius: 5/5 bilaterally.   XII: Tongue midline without fasciculations.  Motor: Normal bulk and tone throughout all four extremities.   LUE: D: 5/5; B: 5/5; T:  5/5; WF: 5/5; WE:  5/5; IO: 5/5   RUE: D: 5/5; B: 5/5; T:  5/5; WF:5/5; WE:  5/5; IO: 5/5   LLE: HF: 5/5, KE: 5/5, KF: 5/5, DF: 5/5, PF: 5/5  RLE: HF: 5/5, KE: 5/5, KF: 5/5, DF: 5/5, PF: 5/5  No tremors   Sensory: Intact and symmetric to light touch throughout.  Reflexes: LUE: Biceps 2+ " brachioradialis 2+  RUE: Biceps 2+, brachioradialis 2+  LLE: Knee 2+, ankle 2+  RLE: Knee 2+, ankle 2+  Coordination:  Intact and symmetric to finger-to-nose and heel-to-shin.  Gait:  Intact to casual gait.    Imaging:  All pertinent imaging was personally reviewed.    On my personal review:     Probable chronic microvascular ischemic disease of the white matter with grade 1 leukoaraiosis.   No evidence of mesial temporal sclerosis.   No acute intracranial process identified.   Cerebellar atrophy, probably related to chronic anticonvulsants intake.

## 2024-11-07 ENCOUNTER — TELEPHONE (OUTPATIENT)
Dept: FAMILY MEDICINE | Facility: CLINIC | Age: 64
End: 2024-11-07
Payer: MEDICARE

## 2024-11-07 RX ORDER — TRIAMCINOLONE ACETONIDE 1 MG/G
1 CREAM TOPICAL DAILY
Qty: 28.4 G | Refills: 0 | Status: SHIPPED | OUTPATIENT
Start: 2024-11-07

## 2024-11-07 RX ORDER — TRIAMCINOLONE ACETONIDE 1 MG/G
1 CREAM TOPICAL DAILY
COMMUNITY
End: 2024-11-07 | Stop reason: SDUPTHER

## 2024-11-07 NOTE — TELEPHONE ENCOUNTER
----- Message from Robe Rendon MD sent at 11/5/2024  9:01 AM CST -----  Regarding: Patient requested med refill  Patient requested refill for Tramcinolone cream.  Sending you a message as she is establishing with me for epilepsy.    Thanks,    Robe Rendon MD  Neurology/Epilepsy

## 2024-11-08 LAB — LEVETIRACETAM SERPL-MCNC: <1 UG/ML (ref 3–60)

## 2024-12-24 ENCOUNTER — PATIENT MESSAGE (OUTPATIENT)
Dept: FAMILY MEDICINE | Facility: CLINIC | Age: 64
End: 2024-12-24
Payer: MEDICARE

## 2025-02-14 ENCOUNTER — LAB VISIT (OUTPATIENT)
Dept: LAB | Facility: HOSPITAL | Age: 65
End: 2025-02-14
Attending: INTERNAL MEDICINE
Payer: MEDICARE

## 2025-02-14 DIAGNOSIS — R73.03 PREDIABETES: ICD-10-CM

## 2025-02-14 DIAGNOSIS — E05.00 TOXIC DIFFUSE GOITER: ICD-10-CM

## 2025-02-14 LAB
ALBUMIN SERPL BCP-MCNC: 3.7 G/DL (ref 3.5–5.2)
ALP SERPL-CCNC: 71 U/L (ref 40–150)
ALT SERPL W/O P-5'-P-CCNC: 29 U/L (ref 10–44)
ANION GAP SERPL CALC-SCNC: 7 MMOL/L (ref 8–16)
AST SERPL-CCNC: 41 U/L (ref 10–40)
BILIRUB DIRECT SERPL-MCNC: 0.2 MG/DL (ref 0.1–0.3)
BILIRUB SERPL-MCNC: 0.4 MG/DL (ref 0.1–1)
BUN SERPL-MCNC: 20 MG/DL (ref 8–23)
CALCIUM SERPL-MCNC: 9.6 MG/DL (ref 8.7–10.5)
CHLORIDE SERPL-SCNC: 108 MMOL/L (ref 95–110)
CO2 SERPL-SCNC: 25 MMOL/L (ref 23–29)
CREAT SERPL-MCNC: 0.7 MG/DL (ref 0.5–1.4)
EST. GFR  (NO RACE VARIABLE): >60 ML/MIN/1.73 M^2
ESTIMATED AVG GLUCOSE: 105 MG/DL (ref 68–131)
GLUCOSE SERPL-MCNC: 89 MG/DL (ref 70–110)
HBA1C MFR BLD: 5.3 % (ref 4–5.6)
POTASSIUM SERPL-SCNC: 4.6 MMOL/L (ref 3.5–5.1)
PROT SERPL-MCNC: 7.3 G/DL (ref 6–8.4)
SODIUM SERPL-SCNC: 140 MMOL/L (ref 136–145)
TSH SERPL DL<=0.005 MIU/L-ACNC: 0.83 UIU/ML (ref 0.4–4)

## 2025-02-14 PROCEDURE — 80048 BASIC METABOLIC PNL TOTAL CA: CPT | Performed by: INTERNAL MEDICINE

## 2025-02-14 PROCEDURE — 84443 ASSAY THYROID STIM HORMONE: CPT | Performed by: INTERNAL MEDICINE

## 2025-02-14 PROCEDURE — 80076 HEPATIC FUNCTION PANEL: CPT | Performed by: INTERNAL MEDICINE

## 2025-02-14 PROCEDURE — 83036 HEMOGLOBIN GLYCOSYLATED A1C: CPT | Performed by: INTERNAL MEDICINE

## 2025-02-20 ENCOUNTER — OFFICE VISIT (OUTPATIENT)
Dept: INTERNAL MEDICINE | Facility: CLINIC | Age: 65
End: 2025-02-20
Payer: MEDICARE

## 2025-02-20 VITALS
HEIGHT: 71 IN | OXYGEN SATURATION: 95 % | WEIGHT: 259.5 LBS | DIASTOLIC BLOOD PRESSURE: 78 MMHG | SYSTOLIC BLOOD PRESSURE: 124 MMHG | BODY MASS INDEX: 36.33 KG/M2 | HEART RATE: 75 BPM

## 2025-02-20 DIAGNOSIS — E05.00 TOXIC DIFFUSE GOITER: ICD-10-CM

## 2025-02-20 DIAGNOSIS — R73.03 PREDIABETES: ICD-10-CM

## 2025-02-20 DIAGNOSIS — Z12.4 SCREENING FOR CERVICAL CANCER: ICD-10-CM

## 2025-02-20 DIAGNOSIS — E66.01 SEVERE OBESITY (BMI 35.0-39.9) WITH COMORBIDITY: ICD-10-CM

## 2025-02-20 DIAGNOSIS — I10 ESSENTIAL HYPERTENSION: Primary | ICD-10-CM

## 2025-02-20 DIAGNOSIS — Z23 NEED FOR VACCINATION: ICD-10-CM

## 2025-02-20 DIAGNOSIS — R94.5 ABNORMAL RESULTS OF LIVER FUNCTION STUDIES: ICD-10-CM

## 2025-02-20 DIAGNOSIS — R79.89 ABNORMAL LIVER FUNCTION TEST: ICD-10-CM

## 2025-02-20 DIAGNOSIS — G40.909 SEIZURE DISORDER: ICD-10-CM

## 2025-02-20 PROCEDURE — 1160F RVW MEDS BY RX/DR IN RCRD: CPT | Mod: CPTII,S$GLB,, | Performed by: INTERNAL MEDICINE

## 2025-02-20 PROCEDURE — 90480 ADMN SARSCOV2 VAC 1/ONLY CMP: CPT | Mod: S$GLB,,, | Performed by: INTERNAL MEDICINE

## 2025-02-20 PROCEDURE — 1101F PT FALLS ASSESS-DOCD LE1/YR: CPT | Mod: CPTII,S$GLB,, | Performed by: INTERNAL MEDICINE

## 2025-02-20 PROCEDURE — 3288F FALL RISK ASSESSMENT DOCD: CPT | Mod: CPTII,S$GLB,, | Performed by: INTERNAL MEDICINE

## 2025-02-20 PROCEDURE — 1159F MED LIST DOCD IN RCRD: CPT | Mod: CPTII,S$GLB,, | Performed by: INTERNAL MEDICINE

## 2025-02-20 NOTE — PROGRESS NOTES
Assessment:         1. Essential hypertension    2. Need for vaccination    3. Severe obesity (BMI 35.0-39.9) with comorbidity    4. Seizure disorder    5. Screening for cervical cancer    6. Abnormal liver function test    7. Abnormal results of liver function studies    8. Toxic diffuse goiter    9. Prediabetes          Plan:           Jacque was seen today for follow-up.    Diagnoses and all orders for this visit:    Essential hypertension  -     Basic Metabolic Panel; Standing  -     Hepatic Function Panel; Standing  -     Hemoglobin A1C; Standing    Need for vaccination  -     COVID-19 (Moderna) 50 mcg/0.5 mL IM vaccine (>/= 13 yo) 0.5 mL    Severe obesity (BMI 35.0-39.9) with comorbidity    Seizure disorder    Screening for cervical cancer  -     Ambulatory referral/consult to Obstetrics / Gynecology; Future    Abnormal liver function test  -     Hepatic Function Panel; Future  -     Cancel: CBC Auto Differential; Future  -     Ferritin; Future  -     Protime-INR; Future  -     CBC Auto Differential; Future    Abnormal results of liver function studies  -     Protime-INR; Future    Toxic diffuse goiter    Prediabetes  -     Basic Metabolic Panel; Standing  -     Hepatic Function Panel; Standing  -     Hemoglobin A1C; Standing        Assessment & Plan    IMPRESSION:  Reviewed recent labs results: electrolytes, kidney function, A1c, liver tests, and thyroid levels  Noted mildly elevated liver enzyme, likely not clinically significant given patient's denial of alcohol use and minimal OTC medication use  Consulted with endocrinology regarding patient's history of Graves' disease; agreed with current management of annual thyroid checks given low normal levels and normal antibodies  Assessed patient's exercise regimen  Evaluated patient's eligibility for routine health screenings, including Pap smear and mammogram    PATIENT EDUCATION:  - Explained normal ranges for blood test results, including A1c and liver  function tests  - Discussed appropriate target heart rate range during exercise (110-140 bpm)  - Explained sugar content in regular lemonade and its potential impact on weight  - Clarified eligibility and guidelines for routine health screenings, including Pap smear frequency for patients over 65    ACTION ITEMS/LIFESTYLE:  - Patient to increase exercise frequency to more than 2 days per week if possible  - Recommend switching from fried fish to baked fish when eating out  - Patient to reduce consumption of sugary drinks, particularly regular lemonade  - Recommend switching to water or low-sugar alternatives like Propel    MEDICATIONS:  - Continued amlodipine  - Patient to call the office or pharmacy for medication refills when running low    ORDERS:  - Administered COVID-19 vaccine booster in office    REFERRALS:  - Referred to OB/GYN for final Pap smear    FOLLOW UP:  - Follow up in 6 months with the physician assistant  - Follow up annually for thyroid level check  - Contact office or use patient portal ([employer]) for medication refills when needed             Subjective:       Patient ID: Jacque Burns is a 64 y.o. female.    Chief Complaint: Follow-up      Interim Hx  Concerns today  Chronic problems        History of Present Illness    CHIEF COMPLAINT:  Patient presents today for follow-up of multiple medical problems.    MEDICAL HISTORY:  She has a history of Graves' disease, with treatment discontinued in 2009.    CURRENT MEDICATIONS:  She continues amlodipine and russ, both with two refills remaining.    DIET AND EXERCISE:  She attends gym twice weekly on Monday and Tuesday, performing cardiovascular activities (treadmill, bicycle, elliptical) and strength training targeting arms and abdominal muscles. Her diet consists of grapes and banana for breakfast, fried fish for lunch, and dinner of red beans and rice, meatballs, cabbage and vegetables (corn, cauliflower, broccoli). She drinks regular  "lemonade and occasional Propel.    IMMUNIZATIONS:  She received influenza and shingles vaccines on September 2nd.           Review of Systems   All other systems reviewed and are negative.          Health Maintenance Due   Topic Date Due    RSV Vaccine (Age 60+ and Pregnant patients) (1 - Risk 60-74 years 1-dose series) Never done    Shingles Vaccine (2 of 2) 10/24/2024         Objective:     /78 (BP Location: Right arm, Patient Position: Sitting)   Pulse 75   Ht 5' 11" (1.803 m)   Wt 117.7 kg (259 lb 7.7 oz)   SpO2 95%   BMI 36.19 kg/m²   .Physical Exam                  2/20/2025     7:41 AM 11/5/2024     8:35 AM 8/19/2024     8:16 AM 7/22/2024     1:14 PM 7/24/2023    11:50 AM   Vitals   Height 5' 11" (1.803 m) 5' 11" (1.803 m) 5' 11" (1.803 m) 5' 11" (1.803 m)    Weight (lbs) 259.48 254.85 257.28 255.73 255   BMI (kg/m2) 36.2 35.6 35.9 35.7           Physical Exam  Vitals and nursing note reviewed.   Constitutional:       General: She is not in acute distress.     Appearance: She is well-developed. She is not diaphoretic.   HENT:      Head: Normocephalic.      Nose: Nose normal.   Eyes:      General:         Right eye: No discharge.         Left eye: No discharge.      Conjunctiva/sclera: Conjunctivae normal.      Pupils: Pupils are equal, round, and reactive to light.   Cardiovascular:      Rate and Rhythm: Normal rate and regular rhythm.      Heart sounds: Normal heart sounds. No murmur heard.     No friction rub. No gallop.   Pulmonary:      Effort: Pulmonary effort is normal. No respiratory distress.   Abdominal:      General: Bowel sounds are normal. There is no distension.      Palpations: Abdomen is soft.   Musculoskeletal:         General: No deformity. Normal range of motion.      Cervical back: Normal range of motion.   Skin:     General: Skin is warm.   Neurological:      Mental Status: She is alert and oriented to person, place, and time.      Cranial Nerves: No cranial nerve deficit.       "     Recent Results (from the past 2 weeks)   Basic Metabolic Panel    Collection Time: 02/14/25  7:56 AM   Result Value Ref Range    Sodium 140 136 - 145 mmol/L    Potassium 4.6 3.5 - 5.1 mmol/L    Chloride 108 95 - 110 mmol/L    CO2 25 23 - 29 mmol/L    Glucose 89 70 - 110 mg/dL    BUN 20 8 - 23 mg/dL    Creatinine 0.7 0.5 - 1.4 mg/dL    Calcium 9.6 8.7 - 10.5 mg/dL    Anion Gap 7 (L) 8 - 16 mmol/L    eGFR >60.0 >60 mL/min/1.73 m^2   Hemoglobin A1C    Collection Time: 02/14/25  7:56 AM   Result Value Ref Range    Hemoglobin A1C 5.3 4.0 - 5.6 %    Estimated Avg Glucose 105 68 - 131 mg/dL   Hepatic Function Panel    Collection Time: 02/14/25  7:56 AM   Result Value Ref Range    Total Protein 7.3 6.0 - 8.4 g/dL    Albumin 3.7 3.5 - 5.2 g/dL    Total Bilirubin 0.4 0.1 - 1.0 mg/dL    Bilirubin, Direct 0.2 0.1 - 0.3 mg/dL    AST 41 (H) 10 - 40 U/L    ALT 29 10 - 44 U/L    Alkaline Phosphatase 71 40 - 150 U/L   TSH    Collection Time: 02/14/25  7:56 AM   Result Value Ref Range    TSH 0.833 0.400 - 4.000 uIU/mL     Lab Results   Component Value Date    HEPCAB Non-reactive 07/22/2024         Future Appointments   Date Time Provider Department [place]   3/20/2025  8:15 AM LAB, [facility] [place]   6/3/2025 11:20 AM Robe Rendon MD [facility] NEURO [place]   8/20/2025  7:00 AM LAB, [facility]   8/22/2025  7:20 AM Han Matos III, MD [facility]         Medication List with Changes/Refills   Current Medications    AMLODIPINE (NORVASC) 10 MG TABLET    Take 10 mg by mouth once daily.    LEVETIRACETAM (KEPPRA) 500 MG TAB    Take 1 tablet (500 mg total) by mouth once daily.    TRIAMCINOLONE ACETONIDE 0.1% (KENALOG) 0.1 % CREAM    Apply 1 g (1,000 mg total) topically once daily.         Disclaimer:  This note has been generated using voice-recognition software. There may be grammatical or spelling errors that have been missed during proof-reading   This note was generated with the assistance of  ambient listening technology. Verbal consent was obtained by the patient and accompanying visitor(s) for the recording of patient appointment to facilitate this note. I attest to having reviewed and edited the generated note for accuracy, though some syntax or spelling errors may persist. Please contact the author of this note for any clarification.

## 2025-03-20 ENCOUNTER — LAB VISIT (OUTPATIENT)
Dept: LAB | Facility: HOSPITAL | Age: 65
End: 2025-03-20
Attending: INTERNAL MEDICINE
Payer: MEDICARE

## 2025-03-20 DIAGNOSIS — R94.5 ABNORMAL RESULTS OF LIVER FUNCTION STUDIES: ICD-10-CM

## 2025-03-20 DIAGNOSIS — R79.89 ABNORMAL LIVER FUNCTION TEST: ICD-10-CM

## 2025-03-20 LAB
ALBUMIN SERPL BCP-MCNC: 3.5 G/DL (ref 3.5–5.2)
ALP SERPL-CCNC: 65 U/L (ref 40–150)
ALT SERPL W/O P-5'-P-CCNC: 17 U/L (ref 10–44)
AST SERPL-CCNC: 16 U/L (ref 10–40)
BASOPHILS # BLD AUTO: 0.03 K/UL (ref 0–0.2)
BASOPHILS NFR BLD: 0.5 % (ref 0–1.9)
BILIRUB DIRECT SERPL-MCNC: 0.2 MG/DL (ref 0.1–0.3)
BILIRUB SERPL-MCNC: 0.5 MG/DL (ref 0.1–1)
DIFFERENTIAL METHOD BLD: ABNORMAL
EOSINOPHIL # BLD AUTO: 0.2 K/UL (ref 0–0.5)
EOSINOPHIL NFR BLD: 3.2 % (ref 0–8)
ERYTHROCYTE [DISTWIDTH] IN BLOOD BY AUTOMATED COUNT: 14.4 % (ref 11.5–14.5)
FERRITIN SERPL-MCNC: 63 NG/ML (ref 20–300)
HCT VFR BLD AUTO: 43.8 % (ref 37–48.5)
HGB BLD-MCNC: 14 G/DL (ref 12–16)
IMM GRANULOCYTES # BLD AUTO: 0.02 K/UL (ref 0–0.04)
IMM GRANULOCYTES NFR BLD AUTO: 0.3 % (ref 0–0.5)
INR PPP: 1 (ref 0.8–1.2)
LYMPHOCYTES # BLD AUTO: 2.1 K/UL (ref 1–4.8)
LYMPHOCYTES NFR BLD: 35 % (ref 18–48)
MCH RBC QN AUTO: 32.4 PG (ref 27–31)
MCHC RBC AUTO-ENTMCNC: 32 G/DL (ref 32–36)
MCV RBC AUTO: 101 FL (ref 82–98)
MONOCYTES # BLD AUTO: 0.6 K/UL (ref 0.3–1)
MONOCYTES NFR BLD: 9.2 % (ref 4–15)
NEUTROPHILS # BLD AUTO: 3.1 K/UL (ref 1.8–7.7)
NEUTROPHILS NFR BLD: 51.8 % (ref 38–73)
NRBC BLD-RTO: 0 /100 WBC
PLATELET # BLD AUTO: 220 K/UL (ref 150–450)
PMV BLD AUTO: 11.2 FL (ref 9.2–12.9)
PROT SERPL-MCNC: 7 G/DL (ref 6–8.4)
PROTHROMBIN TIME: 11.4 SEC (ref 9–12.5)
RBC # BLD AUTO: 4.32 M/UL (ref 4–5.4)
WBC # BLD AUTO: 5.95 K/UL (ref 3.9–12.7)

## 2025-03-20 PROCEDURE — 36415 COLL VENOUS BLD VENIPUNCTURE: CPT | Mod: PO | Performed by: INTERNAL MEDICINE

## 2025-03-20 PROCEDURE — 82728 ASSAY OF FERRITIN: CPT | Performed by: INTERNAL MEDICINE

## 2025-03-20 PROCEDURE — 85025 COMPLETE CBC W/AUTO DIFF WBC: CPT | Performed by: INTERNAL MEDICINE

## 2025-03-20 PROCEDURE — 85610 PROTHROMBIN TIME: CPT | Performed by: INTERNAL MEDICINE

## 2025-03-20 PROCEDURE — 80076 HEPATIC FUNCTION PANEL: CPT | Performed by: INTERNAL MEDICINE

## 2025-03-21 ENCOUNTER — RESULTS FOLLOW-UP (OUTPATIENT)
Dept: INTERNAL MEDICINE | Facility: CLINIC | Age: 65
End: 2025-03-21

## 2025-03-28 RX ORDER — AMLODIPINE BESYLATE 10 MG/1
10 TABLET ORAL DAILY
Qty: 90 TABLET | Refills: 3 | Status: SHIPPED | OUTPATIENT
Start: 2025-03-28

## 2025-03-28 NOTE — TELEPHONE ENCOUNTER
Refill Routing Note   Medication(s) are not appropriate for processing by [facility] for the following reason(s):        No active prescription written by provider    [facility] action(s):  Defer               Appointments  past 12m or future 3m with PCP    Date Provider   Last Visit   2/20/2025 Han Matos III, MD   Next Visit   8/22/2025 Han Matos III, MD   ED visits in past 90 days: 0        Note composed:11:44 AM 03/28/2025

## 2025-03-28 NOTE — TELEPHONE ENCOUNTER
No care due was identified.  [facility] Embedded Care Due Messages. Reference number: [id number].   3/28/2025 9:18:36 AM CDT

## 2025-04-14 ENCOUNTER — OFFICE VISIT (OUTPATIENT)
Dept: OBSTETRICS AND GYNECOLOGY | Facility: CLINIC | Age: 65
End: 2025-04-14
Payer: MEDICARE

## 2025-04-14 VITALS
SYSTOLIC BLOOD PRESSURE: 107 MMHG | DIASTOLIC BLOOD PRESSURE: 69 MMHG | WEIGHT: 253.31 LBS | HEART RATE: 77 BPM | BODY MASS INDEX: 35.33 KG/M2

## 2025-04-14 DIAGNOSIS — Z12.4 SCREENING FOR CERVICAL CANCER: ICD-10-CM

## 2025-04-14 DIAGNOSIS — Z12.31 ENCOUNTER FOR SCREENING MAMMOGRAM FOR MALIGNANT NEOPLASM OF BREAST: ICD-10-CM

## 2025-04-14 DIAGNOSIS — Z11.3 SCREENING EXAMINATION FOR STD (SEXUALLY TRANSMITTED DISEASE): ICD-10-CM

## 2025-04-14 DIAGNOSIS — Z01.419 WELL WOMAN EXAM WITH ROUTINE GYNECOLOGICAL EXAM: Primary | ICD-10-CM

## 2025-04-14 PROCEDURE — 3074F SYST BP LT 130 MM HG: CPT | Mod: CPTII,S$GLB,,

## 2025-04-14 PROCEDURE — 1160F RVW MEDS BY RX/DR IN RCRD: CPT | Mod: CPTII,S$GLB,,

## 2025-04-14 PROCEDURE — 1126F AMNT PAIN NOTED NONE PRSNT: CPT | Mod: CPTII,S$GLB,,

## 2025-04-14 PROCEDURE — 87591 N.GONORRHOEAE DNA AMP PROB: CPT

## 2025-04-14 PROCEDURE — 3008F BODY MASS INDEX DOCD: CPT | Mod: CPTII,S$GLB,,

## 2025-04-14 PROCEDURE — 3288F FALL RISK ASSESSMENT DOCD: CPT | Mod: CPTII,S$GLB,,

## 2025-04-14 PROCEDURE — 3044F HG A1C LEVEL LT 7.0%: CPT | Mod: CPTII,S$GLB,,

## 2025-04-14 PROCEDURE — 88175 CYTOPATH C/V AUTO FLUID REDO: CPT

## 2025-04-14 PROCEDURE — 87624 HPV HI-RISK TYP POOLED RSLT: CPT

## 2025-04-14 PROCEDURE — 1101F PT FALLS ASSESS-DOCD LE1/YR: CPT | Mod: CPTII,S$GLB,,

## 2025-04-14 PROCEDURE — 1159F MED LIST DOCD IN RCRD: CPT | Mod: CPTII,S$GLB,,

## 2025-04-14 PROCEDURE — 99387 INIT PM E/M NEW PAT 65+ YRS: CPT | Mod: S$GLB,,,

## 2025-04-14 PROCEDURE — 99999 PR PBB SHADOW E&M-EST. PATIENT-LVL III: CPT | Mod: PBBFAC,,,

## 2025-04-14 PROCEDURE — 3078F DIAST BP <80 MM HG: CPT | Mod: CPTII,S$GLB,,

## 2025-04-14 NOTE — PROGRESS NOTES
SUBJECTIVE:   64 y.o. female  presents for annual well woman exam.   No LMP recorded. Patient is postmenopausal.. Age of first menarche: 10.  at age 43, not on HRT. Denies  bleeding.  Is not currently sexually active.  Would like STD testing without blood work.  Denies any abnormal bleeding, vaginal pain, discharge, itching, irritation, or odor.   Denies any breast, urinary, or bowel complaints.   Denies domestic violence. Lives with partner. Feels safe. Works at [employer].        Family history: No family history of breast, ovarian, endometrial and colon cancer    Pap -unknown, it has been more than >5 years  -History of abnormal pap-denies  MMG 10/2024-normal, due this year in October.   Colonoscopy 2022  DEXA 2024-osteopenia         History reviewed. No pertinent past medical history.  History reviewed. No pertinent surgical history.  Social History[1]  Family History   Problem Relation Name Age of Onset    Heart failure Mother      Colon cancer Neg Hx      Breast cancer Neg Hx      Ovarian cancer Neg Hx       OB History    Para Term  AB Living   1 1 1   1   SAB IAB Ectopic Multiple Live Births             # Outcome Date GA Lbr Prince/2nd Weight Sex Type Anes PTL Lv   1 Term                  Current Medications[2]  Allergies: Iodine and Shellfish containing products     ROS:  Constitutional: no weight loss, weight gain, fever, fatigue  Eyes:  No vision changes, glasses/contacts  ENT/Mouth: No ulcers, sinus problems, ears ringing, headache  Cardiovascular: No inability to lie flat, chest pain, exercise intolerance, swelling, heart palpitations  Respiratory: No wheezing, coughing blood, shortness of breath, or cough  Gastrointestinal: No diarrhea, bloody stool, nausea/vomiting, constipation, gas, hemorrhoids  Genitourinary: See HPI  Musculoskeletal: No muscle weakness  Skin/Breast: No painful breasts, nipple discharge, masses, rash, ulcers  Neurological: No passing out, seizures,  numbness, headache  Endocrine: No hot flashes, hair loss, abnormal hair growth, ance  Psychiatric: No depression, crying  Hematologic: No bruises, bleeding, swollen lymph nodes, anemia.      OBJECTIVE:   The patient appears well, alert, oriented x 3, in no distress.  /69 (Patient Position: Sitting)   Pulse 77   Wt 114.9 kg (253 lb 4.9 oz)   BMI 35.33 kg/m²   NECK: no thyromegaly, trachea midline  SKIN: no acne, striae, hirsutism  BREAST EXAM: breasts appear normal, no suspicious masses, no skin or nipple changes or axillary nodes  ABDOMEN: no hernias, masses, or hepatosplenomegaly  GENITALIA: normal external genitalia, no erythema, no discharge  URETHRA: normal urethra, normal urethral meatus  VAGINA: mucosal atrophy  CERVIX: no lesions or cervical motion tenderness  UTERUS: normal  ADNEXA: no mass, fullness, tenderness      ASSESSMENT:   Diagnoses and all orders for this visit:    Well woman exam with routine gynecological exam  -     Liquid-Based Pap Smear, Screening    Screening for cervical cancer  -     Ambulatory referral/consult to Obstetrics / Gynecology  -     Liquid-Based Pap Smear, Screening    Encounter for screening mammogram for malignant neoplasm of breast  -     Mammo Digital Screening Bilat w/ Jorge Luis (XPD); Future    Screening examination for STD (sexually transmitted disease)  -     C. trachomatis/N. gonorrhoeae by AMP DNA        Orders Placed This Encounter   Procedures    Mammo Digital Screening Bilat w/ Jorge Luis (XPD)    C. trachomatis/N. gonorrhoeae by AMP DNA     Discussed adequate intake of 1,200 calcium and 800 IU vitamin D  Follow up in 1 year for annual exam or as needed.  Kayla Jiménez PA-C         [1]   Social History  Socioeconomic History    Marital status:     Number of children: 1   Occupational History    Occupation: [employer]   Tobacco Use    Smoking status: Never    Smokeless tobacco: Never   Substance and Sexual Activity    Alcohol use: Never    Drug use: Never     Sexual activity: Not Currently     Partners: Male     Birth control/protection: None   Social History Narrative    Patient is originally from [place]         [facility]           No  background        Working [employer]                     1 Children , kam, one grandchild         Lives with ; self        Diet/Exercise ;         Exercise        Eating    B     L    D    Snacks    Beverages    Fast:food             Social Drivers of Health     Financial Resource Strain: Low Risk  (6/11/2020)    Overall Financial Resource Strain (CARDIA)     Difficulty of Paying Living Expenses: Not very hard   Food Insecurity: Unknown (6/11/2020)    Hunger Vital Sign     Worried About Running Out of Food in the Last Year: Patient declined     Ran Out of Food in the Last Year: Patient declined   Transportation Needs: Unknown (6/11/2020)    PRAPARE - Transportation     Lack of Transportation (Medical): Patient declined     Lack of Transportation (Non-Medical): Patient declined   Physical Activity: Sufficiently Active (6/11/2020)    Exercise Vital Sign     Days of Exercise per Week: 7 days     Minutes of Exercise per Session: 60 min   Stress: No Stress Concern Present (6/11/2020)    [employer] [place] [employer] - Occupational Stress Questionnaire     Feeling of Stress : Only a little   [2]   Current Outpatient Medications   Medication Sig Dispense Refill    amLODIPine (NORVASC) 10 MG tablet Take 1 tablet (10 mg total) by mouth once daily. 90 tablet 3    levETIRAcetam (KEPPRA) 500 MG Tab Take 1 tablet (500 mg total) by mouth once daily. 60 tablet 5    triamcinolone acetonide 0.1% (KENALOG) 0.1 % cream Apply 1 g (1,000 mg total) topically once daily. 28.4 g 0     No current facility-administered medications for this visit.

## 2025-04-16 LAB
C TRACH DNA SPEC QL NAA+PROBE: NOT DETECTED
CTGC SOURCE (OHS) ORD-325: NORMAL
N GONORRHOEA DNA UR QL NAA+PROBE: NOT DETECTED

## 2025-04-17 ENCOUNTER — RESULTS FOLLOW-UP (OUTPATIENT)
Dept: OBSTETRICS AND GYNECOLOGY | Facility: CLINIC | Age: 65
End: 2025-04-17

## 2025-06-03 ENCOUNTER — LAB VISIT (OUTPATIENT)
Dept: LAB | Facility: HOSPITAL | Age: 65
End: 2025-06-03
Attending: STUDENT IN AN ORGANIZED HEALTH CARE EDUCATION/TRAINING PROGRAM
Payer: MEDICARE

## 2025-06-03 ENCOUNTER — OFFICE VISIT (OUTPATIENT)
Dept: NEUROLOGY | Facility: CLINIC | Age: 65
End: 2025-06-03
Payer: MEDICARE

## 2025-06-03 VITALS
BODY MASS INDEX: 34.93 KG/M2 | HEART RATE: 79 BPM | DIASTOLIC BLOOD PRESSURE: 76 MMHG | WEIGHT: 250.44 LBS | SYSTOLIC BLOOD PRESSURE: 116 MMHG

## 2025-06-03 DIAGNOSIS — G40.909 SEIZURE DISORDER: ICD-10-CM

## 2025-06-03 DIAGNOSIS — G40.909 SEIZURE DISORDER: Primary | ICD-10-CM

## 2025-06-03 DIAGNOSIS — R73.03 PREDIABETES: ICD-10-CM

## 2025-06-03 DIAGNOSIS — I10 ESSENTIAL HYPERTENSION: ICD-10-CM

## 2025-06-03 LAB
ALBUMIN SERPL BCP-MCNC: 3.9 G/DL (ref 3.5–5.2)
ALP SERPL-CCNC: 69 UNIT/L (ref 40–150)
ALT SERPL W/O P-5'-P-CCNC: 26 UNIT/L (ref 10–44)
ANION GAP (OHS): 6 MMOL/L (ref 8–16)
AST SERPL-CCNC: 18 UNIT/L (ref 11–45)
BILIRUB DIRECT SERPL-MCNC: 0.1 MG/DL (ref 0.1–0.3)
BILIRUB SERPL-MCNC: 0.3 MG/DL (ref 0.1–1)
BUN SERPL-MCNC: 23 MG/DL (ref 8–23)
CALCIUM SERPL-MCNC: 9.5 MG/DL (ref 8.7–10.5)
CHLORIDE SERPL-SCNC: 110 MMOL/L (ref 95–110)
CO2 SERPL-SCNC: 25 MMOL/L (ref 23–29)
CREAT SERPL-MCNC: 0.9 MG/DL (ref 0.5–1.4)
EAG (OHS): 103 MG/DL (ref 68–131)
GFR SERPLBLD CREATININE-BSD FMLA CKD-EPI: >60 ML/MIN/1.73/M2
GLUCOSE SERPL-MCNC: 85 MG/DL (ref 70–110)
HBA1C MFR BLD: 5.2 % (ref 4–5.6)
INDIRECT COOMBS: NORMAL
POTASSIUM SERPL-SCNC: 4.2 MMOL/L (ref 3.5–5.1)
PROT SERPL-MCNC: 7.4 GM/DL (ref 6–8.4)
RH BLD: NORMAL
SODIUM SERPL-SCNC: 141 MMOL/L (ref 136–145)
SPECIMEN OUTDATE: NORMAL

## 2025-06-03 PROCEDURE — 1159F MED LIST DOCD IN RCRD: CPT | Mod: CPTII,S$GLB,, | Performed by: STUDENT IN AN ORGANIZED HEALTH CARE EDUCATION/TRAINING PROGRAM

## 2025-06-03 PROCEDURE — 1126F AMNT PAIN NOTED NONE PRSNT: CPT | Mod: CPTII,S$GLB,, | Performed by: STUDENT IN AN ORGANIZED HEALTH CARE EDUCATION/TRAINING PROGRAM

## 2025-06-03 PROCEDURE — 99999 PR PBB SHADOW E&M-EST. PATIENT-LVL II: CPT | Mod: PBBFAC,,, | Performed by: STUDENT IN AN ORGANIZED HEALTH CARE EDUCATION/TRAINING PROGRAM

## 2025-06-03 PROCEDURE — 3288F FALL RISK ASSESSMENT DOCD: CPT | Mod: CPTII,S$GLB,, | Performed by: STUDENT IN AN ORGANIZED HEALTH CARE EDUCATION/TRAINING PROGRAM

## 2025-06-03 PROCEDURE — 3044F HG A1C LEVEL LT 7.0%: CPT | Mod: CPTII,S$GLB,, | Performed by: STUDENT IN AN ORGANIZED HEALTH CARE EDUCATION/TRAINING PROGRAM

## 2025-06-03 PROCEDURE — 3078F DIAST BP <80 MM HG: CPT | Mod: CPTII,S$GLB,, | Performed by: STUDENT IN AN ORGANIZED HEALTH CARE EDUCATION/TRAINING PROGRAM

## 2025-06-03 PROCEDURE — 36415 COLL VENOUS BLD VENIPUNCTURE: CPT

## 2025-06-03 PROCEDURE — 86901 BLOOD TYPING SEROLOGIC RH(D): CPT | Performed by: STUDENT IN AN ORGANIZED HEALTH CARE EDUCATION/TRAINING PROGRAM

## 2025-06-03 PROCEDURE — 3074F SYST BP LT 130 MM HG: CPT | Mod: CPTII,S$GLB,, | Performed by: STUDENT IN AN ORGANIZED HEALTH CARE EDUCATION/TRAINING PROGRAM

## 2025-06-03 PROCEDURE — 1101F PT FALLS ASSESS-DOCD LE1/YR: CPT | Mod: CPTII,S$GLB,, | Performed by: STUDENT IN AN ORGANIZED HEALTH CARE EDUCATION/TRAINING PROGRAM

## 2025-06-03 PROCEDURE — 80053 COMPREHEN METABOLIC PANEL: CPT

## 2025-06-03 PROCEDURE — 99214 OFFICE O/P EST MOD 30 MIN: CPT | Mod: S$GLB,,, | Performed by: STUDENT IN AN ORGANIZED HEALTH CARE EDUCATION/TRAINING PROGRAM

## 2025-06-03 PROCEDURE — 83036 HEMOGLOBIN GLYCOSYLATED A1C: CPT

## 2025-06-03 PROCEDURE — 82248 BILIRUBIN DIRECT: CPT

## 2025-06-03 PROCEDURE — 3008F BODY MASS INDEX DOCD: CPT | Mod: CPTII,S$GLB,, | Performed by: STUDENT IN AN ORGANIZED HEALTH CARE EDUCATION/TRAINING PROGRAM

## 2025-06-03 RX ORDER — TRIAMCINOLONE ACETONIDE 1 MG/G
1 CREAM TOPICAL DAILY
Qty: 28.4 G | Refills: 0 | Status: SHIPPED | OUTPATIENT
Start: 2025-06-03

## 2025-06-03 RX ORDER — LEVETIRACETAM 500 MG/1
500 TABLET ORAL 2 TIMES DAILY
Qty: 180 TABLET | Refills: 3 | Status: SHIPPED | OUTPATIENT
Start: 2025-06-03 | End: 2026-05-29

## 2025-06-09 ENCOUNTER — OFFICE VISIT (OUTPATIENT)
Dept: URGENT CARE | Facility: CLINIC | Age: 65
End: 2025-06-09
Payer: MEDICARE

## 2025-06-09 VITALS
OXYGEN SATURATION: 98 % | SYSTOLIC BLOOD PRESSURE: 124 MMHG | RESPIRATION RATE: 18 BRPM | WEIGHT: 240 LBS | TEMPERATURE: 98 F | DIASTOLIC BLOOD PRESSURE: 82 MMHG | BODY MASS INDEX: 33.6 KG/M2 | HEIGHT: 71 IN | HEART RATE: 73 BPM

## 2025-06-09 DIAGNOSIS — L28.2 PRURITIC RASH: Primary | ICD-10-CM

## 2025-06-09 DIAGNOSIS — R21 RASH: ICD-10-CM

## 2025-06-09 PROCEDURE — 99204 OFFICE O/P NEW MOD 45 MIN: CPT | Mod: 25,S$GLB,,

## 2025-06-09 PROCEDURE — 96372 THER/PROPH/DIAG INJ SC/IM: CPT | Mod: S$GLB,,,

## 2025-06-09 RX ORDER — PREDNISONE 20 MG/1
20 TABLET ORAL DAILY
Qty: 4 TABLET | Refills: 0 | Status: SHIPPED | OUTPATIENT
Start: 2025-06-09 | End: 2025-06-13

## 2025-06-09 RX ORDER — DEXAMETHASONE SODIUM PHOSPHATE 10 MG/ML
10 INJECTION INTRAMUSCULAR; INTRAVENOUS
Status: COMPLETED | OUTPATIENT
Start: 2025-06-09 | End: 2025-06-09

## 2025-06-09 RX ORDER — MUPIROCIN 20 MG/G
OINTMENT TOPICAL 3 TIMES DAILY
Qty: 15 G | Refills: 0 | Status: SHIPPED | OUTPATIENT
Start: 2025-06-09 | End: 2025-06-16

## 2025-06-09 RX ADMIN — DEXAMETHASONE SODIUM PHOSPHATE 10 MG: 10 INJECTION INTRAMUSCULAR; INTRAVENOUS at 10:06

## 2025-06-09 NOTE — PROGRESS NOTES
"Subjective:      Patient ID: Jacque Burns is a 64 y.o. female.    Vitals:  height is 5' 11" (1.803 m) and weight is 108.9 kg (240 lb). Her oral temperature is 98.1 °F (36.7 °C). Her blood pressure is 124/82 and her pulse is 73. Her respiration is 18 and oxygen saturation is 98%.     Chief Complaint: Rash    Pt is a 64 y.o. female with PMHx of HTN, osteopenia. She is presenting with rash around eyes, perimeter of scalp, ears, back of neck. Onset of symptoms was 1-2 weeks. States this occurred after receiving a relaxer at the hair salon. Pt went to the same hairdresser who used the same products that she's been exposed to for years. Relaxer was placed on perimeter of scalp, not her entire head. She felt a little burning during the treatment, redness the next day, and noticed rash/itching on day 3. Pt reports using OTC Bendaryl, triamcinolone, and neosporin with mild relief.    Rash  This is a new problem. The current episode started 1 to 4 weeks ago. The problem is unchanged. The affected locations include the neck, back and face. The rash is characterized by redness and itchiness. It is unknown if there was an exposure to a precipitant. Pertinent negatives include no congestion, cough, diarrhea, fever, shortness of breath, sore throat or vomiting. Past treatments include topical steroids and antihistamine. The treatment provided mild relief.       Constitution: Negative for activity change, appetite change, chills and fever.   HENT:  Negative for ear pain, congestion, postnasal drip, sinus pain, sinus pressure and sore throat.    Neck: Negative for neck pain.   Cardiovascular:  Negative for chest pain and sob on exertion.   Eyes:  Negative for eye trauma, eye discharge, eye itching, eye redness, photophobia and blurred vision.   Respiratory:  Negative for cough, shortness of breath, wheezing and asthma.    Gastrointestinal:  Negative for abdominal pain, nausea, vomiting, constipation and diarrhea. "   Genitourinary:  Negative for dysuria, frequency, urgency, urine decreased and hematuria.   Musculoskeletal:  Negative for pain and muscle ache.   Skin:  Positive for rash. Negative for color change, erythema and hives.   Allergic/Immunologic: Negative for seasonal allergies, asthma, hives and sneezing.   Neurological:  Negative for dizziness, light-headedness, headaches and altered mental status.   Psychiatric/Behavioral:  Negative for altered mental status and confusion.       Objective:     Physical Exam   Constitutional: She is oriented to person, place, and time. She appears well-developed.      Comments:Pt sitting erect on examination table. No acute respiratory distress, no use of accessory muscles, no notice of nasal flaring.        HENT:   Head: Normocephalic and atraumatic. Head is without abrasion, without contusion and without laceration.   Ears:   Right Ear: External ear normal.   Left Ear: External ear normal.   Nose: Nose normal.   Mouth/Throat: Oropharynx is clear and moist and mucous membranes are normal.   Eyes: Conjunctivae, EOM and lids are normal. Pupils are equal, round, and reactive to light.   Neck: Trachea normal and phonation normal. Neck supple.   Cardiovascular: Normal rate, regular rhythm and normal heart sounds.   Pulmonary/Chest: Effort normal and breath sounds normal. No respiratory distress.   Musculoskeletal: Normal range of motion.         General: Normal range of motion.   Neurological: She is alert and oriented to person, place, and time.   Skin: Skin is warm, dry, intact and no rash. Capillary refill takes less than 2 seconds. No abrasion, No burn, No bruising, No erythema and No ecchymosis   Psychiatric: Her speech is normal and behavior is normal. Judgment and thought content normal.   Nursing note and vitals reviewed.      Assessment:     1. Pruritic rash    2. Rash        Plan:   I have reviewed the patient chart and pertinent past imaging/labs.      Pruritic rash  -      dexAMETHasone injection 10 mg  -     predniSONE (DELTASONE) 20 MG tablet; Take 1 tablet (20 mg total) by mouth once daily. for 4 days  Dispense: 4 tablet; Refill: 0    Rash  -     mupirocin (BACTROBAN) 2 % ointment; Apply topically 3 (three) times daily. for 7 days  Dispense: 15 g; Refill: 0

## 2025-06-09 NOTE — PATIENT INSTRUCTIONS
Mupirocin twice daily for prevent any infection. Continue using Triamcinolone twice daily for itching. Avoid using on face  Beginning tomorrow, may take prednisone once daily as needed for itching   Please return here or go to the Emergency Department for any concerns or worsening of condition.  If you were prescribed antibiotics, please take them to completion.  If you were prescribed a narcotic medication, do not drive or operate heavy equipment or machinery while taking these medications.  Please follow up with your primary care doctor or specialist as needed.    If you  smoke, please stop smoking.

## 2025-08-20 ENCOUNTER — LAB VISIT (OUTPATIENT)
Dept: LAB | Facility: HOSPITAL | Age: 65
End: 2025-08-20
Attending: INTERNAL MEDICINE
Payer: MEDICARE

## 2025-08-20 DIAGNOSIS — I10 ESSENTIAL HYPERTENSION: ICD-10-CM

## 2025-08-20 DIAGNOSIS — R73.03 PREDIABETES: ICD-10-CM

## 2025-08-20 LAB
ALBUMIN SERPL BCP-MCNC: 3.7 G/DL (ref 3.5–5.2)
ALP SERPL-CCNC: 68 UNIT/L (ref 40–150)
ALT SERPL W/O P-5'-P-CCNC: 20 UNIT/L (ref 0–55)
ANION GAP (OHS): 8 MMOL/L (ref 8–16)
AST SERPL-CCNC: 19 UNIT/L (ref 0–50)
BILIRUB DIRECT SERPL-MCNC: 0.2 MG/DL (ref 0.1–0.3)
BILIRUB SERPL-MCNC: 0.5 MG/DL (ref 0.1–1)
BUN SERPL-MCNC: 27 MG/DL (ref 8–23)
CALCIUM SERPL-MCNC: 9.3 MG/DL (ref 8.7–10.5)
CHLORIDE SERPL-SCNC: 111 MMOL/L (ref 95–110)
CO2 SERPL-SCNC: 23 MMOL/L (ref 23–29)
CREAT SERPL-MCNC: 0.8 MG/DL (ref 0.5–1.4)
EAG (OHS): 97 MG/DL (ref 68–131)
GFR SERPLBLD CREATININE-BSD FMLA CKD-EPI: >60 ML/MIN/1.73/M2
GLUCOSE SERPL-MCNC: 96 MG/DL (ref 70–110)
HBA1C MFR BLD: 5 % (ref 4–5.6)
POTASSIUM SERPL-SCNC: 4.1 MMOL/L (ref 3.5–5.1)
PROT SERPL-MCNC: 7.1 GM/DL (ref 6–8.4)
SODIUM SERPL-SCNC: 142 MMOL/L (ref 136–145)

## 2025-08-20 PROCEDURE — 36415 COLL VENOUS BLD VENIPUNCTURE: CPT | Mod: PO

## 2025-08-20 PROCEDURE — 82310 ASSAY OF CALCIUM: CPT

## 2025-08-20 PROCEDURE — 82248 BILIRUBIN DIRECT: CPT

## 2025-08-20 PROCEDURE — 83036 HEMOGLOBIN GLYCOSYLATED A1C: CPT

## 2025-08-22 ENCOUNTER — LAB VISIT (OUTPATIENT)
Dept: LAB | Facility: HOSPITAL | Age: 65
End: 2025-08-22
Attending: INTERNAL MEDICINE
Payer: MEDICARE

## 2025-08-22 ENCOUNTER — OFFICE VISIT (OUTPATIENT)
Dept: FAMILY MEDICINE | Facility: CLINIC | Age: 65
End: 2025-08-22
Payer: MEDICARE

## 2025-08-22 ENCOUNTER — TELEPHONE (OUTPATIENT)
Dept: FAMILY MEDICINE | Facility: CLINIC | Age: 65
End: 2025-08-22

## 2025-08-22 VITALS
DIASTOLIC BLOOD PRESSURE: 84 MMHG | HEART RATE: 78 BPM | SYSTOLIC BLOOD PRESSURE: 140 MMHG | WEIGHT: 257.69 LBS | HEIGHT: 71 IN | OXYGEN SATURATION: 97 % | BODY MASS INDEX: 36.08 KG/M2

## 2025-08-22 DIAGNOSIS — R35.1 NOCTURIA: ICD-10-CM

## 2025-08-22 DIAGNOSIS — M85.80 OSTEOPENIA, UNSPECIFIED LOCATION: ICD-10-CM

## 2025-08-22 DIAGNOSIS — G40.909 SEIZURE DISORDER: ICD-10-CM

## 2025-08-22 DIAGNOSIS — R94.6 ABNORMAL RESULTS OF THYROID FUNCTION STUDIES: ICD-10-CM

## 2025-08-22 DIAGNOSIS — R79.89 LOW TSH LEVEL: ICD-10-CM

## 2025-08-22 DIAGNOSIS — R73.03 PREDIABETES: ICD-10-CM

## 2025-08-22 DIAGNOSIS — E05.00 GRAVES DISEASE: ICD-10-CM

## 2025-08-22 DIAGNOSIS — E05.00 TOXIC DIFFUSE GOITER: ICD-10-CM

## 2025-08-22 DIAGNOSIS — I10 ESSENTIAL HYPERTENSION: Primary | ICD-10-CM

## 2025-08-22 DIAGNOSIS — R79.89 ABNORMAL LIVER FUNCTION TEST: ICD-10-CM

## 2025-08-22 PROBLEM — R29.898 DECREASED STRENGTH OF LOWER EXTREMITY: Status: RESOLVED | Noted: 2024-08-27 | Resolved: 2025-08-22

## 2025-08-22 PROBLEM — M79.651 RIGHT THIGH PAIN: Status: RESOLVED | Noted: 2024-08-27 | Resolved: 2025-08-22

## 2025-08-22 LAB
ALBUMIN/CREAT UR: 46.7 UG/MG
BACTERIA #/AREA URNS AUTO: ABNORMAL /HPF
BILIRUB UR QL STRIP.AUTO: NEGATIVE
CLARITY UR: CLEAR
COLOR UR AUTO: YELLOW
CREAT UR-MCNC: 90 MG/DL (ref 15–325)
GLUCOSE UR QL STRIP: NEGATIVE
HGB UR QL STRIP: NEGATIVE
KETONES UR QL STRIP: NEGATIVE
LEUKOCYTE ESTERASE UR QL STRIP: ABNORMAL
MICROALBUMIN UR-MCNC: 42 UG/ML
MICROSCOPIC COMMENT: ABNORMAL
NITRITE UR QL STRIP: NEGATIVE
PH UR STRIP: 6 [PH]
PROT UR QL STRIP: NEGATIVE
RBC #/AREA URNS AUTO: 3 /HPF (ref 0–4)
SP GR UR STRIP: 1.02
SQUAMOUS #/AREA URNS AUTO: <1 /HPF
UROBILINOGEN UR STRIP-ACNC: NEGATIVE EU/DL
WBC #/AREA URNS AUTO: 15 /HPF (ref 0–5)

## 2025-08-22 PROCEDURE — 81001 URINALYSIS AUTO W/SCOPE: CPT

## 2025-08-22 PROCEDURE — 99999 PR PBB SHADOW E&M-EST. PATIENT-LVL III: CPT | Mod: PBBFAC,,, | Performed by: INTERNAL MEDICINE

## 2025-08-22 PROCEDURE — 87088 URINE BACTERIA CULTURE: CPT

## 2025-08-22 PROCEDURE — 82570 ASSAY OF URINE CREATININE: CPT

## 2025-08-22 RX ORDER — DICLOFENAC SODIUM 100 MG/1
TABLET, EXTENDED RELEASE ORAL
COMMUNITY

## 2025-08-22 RX ORDER — NITROFURANTOIN 25; 75 MG/1; MG/1
100 CAPSULE ORAL 2 TIMES DAILY
Qty: 10 CAPSULE | Refills: 0 | Status: SHIPPED | OUTPATIENT
Start: 2025-08-22 | End: 2025-08-27

## 2025-08-25 LAB — BACTERIA UR CULT: ABNORMAL
